# Patient Record
Sex: MALE | Race: WHITE | Employment: FULL TIME | URBAN - METROPOLITAN AREA
[De-identification: names, ages, dates, MRNs, and addresses within clinical notes are randomized per-mention and may not be internally consistent; named-entity substitution may affect disease eponyms.]

---

## 2017-01-27 ENCOUNTER — APPOINTMENT (OUTPATIENT)
Dept: GENERAL RADIOLOGY | Age: 57
End: 2017-01-27
Attending: EMERGENCY MEDICINE
Payer: COMMERCIAL

## 2017-01-27 ENCOUNTER — TELEPHONE (OUTPATIENT)
Dept: FAMILY MEDICINE CLINIC | Facility: CLINIC | Age: 57
End: 2017-01-27

## 2017-01-27 ENCOUNTER — HOSPITAL ENCOUNTER (OUTPATIENT)
Age: 57
Discharge: HOME | End: 2017-01-27
Attending: EMERGENCY MEDICINE
Payer: COMMERCIAL

## 2017-01-27 VITALS
BODY MASS INDEX: 27.2 KG/M2 | WEIGHT: 190 LBS | RESPIRATION RATE: 18 BRPM | DIASTOLIC BLOOD PRESSURE: 78 MMHG | OXYGEN SATURATION: 98 % | HEIGHT: 70 IN | SYSTOLIC BLOOD PRESSURE: 130 MMHG | HEART RATE: 62 BPM | TEMPERATURE: 98 F

## 2017-01-27 DIAGNOSIS — J06.9 UPPER RESPIRATORY TRACT INFECTION, UNSPECIFIED TYPE: Primary | ICD-10-CM

## 2017-01-27 PROCEDURE — 99213 OFFICE O/P EST LOW 20 MIN: CPT

## 2017-01-27 PROCEDURE — 71020 XR CHEST PA + LAT CHEST (CPT=71020): CPT

## 2017-01-27 PROCEDURE — 99204 OFFICE O/P NEW MOD 45 MIN: CPT

## 2017-01-27 RX ORDER — INHALER, ASSIST DEVICES
SPACER (EA) MISCELLANEOUS
Qty: 1 EACH | Refills: 0 | Status: SHIPPED | OUTPATIENT
Start: 2017-01-27

## 2017-01-27 RX ORDER — ALBUTEROL SULFATE 90 UG/1
2 AEROSOL, METERED RESPIRATORY (INHALATION) EVERY 6 HOURS PRN
Qty: 1 INHALER | Refills: 0 | Status: SHIPPED | OUTPATIENT
Start: 2017-01-27 | End: 2017-02-06

## 2017-01-27 NOTE — TELEPHONE ENCOUNTER
Spoke to pt, states that over the weekend he developed a fever with chills/sweating. On Monday, the fever had resolved but he continues to have what he describes as \"gurgling\" in his lungs. Pt able to speak in full sentences, does not sound sob.  Pt has t

## 2017-01-28 NOTE — ED PROVIDER NOTES
Patient Seen in: [facility]    History   Patient presents with:  Cough/URI    Stated Complaint: cough    HPI    29-year-old male patient presents complaining of recovering from flulike illness this past week.   However not Device 01/27/17 6908 None (Room air)       Current:/78 mmHg  Pulse 62  Temp(Src) 97.6 °F (36.4 °C) (Oral)  Resp 18  Ht 177.8 cm (5' 10\")  Wt 86.183 kg  BMI 27.26 kg/m2  SpO2 98%        Physical Exam    Gen: Awake alert in no apparent distress  HEENT

## 2017-01-30 NOTE — TELEPHONE ENCOUNTER
RN F/u call: MELLISSA Pt seen at [facility] as recommended.  Needs f/u appt with JSK for re-eval and to discuss cxr- cardiomegaly (new finding)  JENNA pls book f/u appt thanks

## 2017-01-30 NOTE — TELEPHONE ENCOUNTER
Pt called to schedule follow up. Appointment time and dates available conflict with work schedule. Will contact office later to schedule.

## 2017-01-31 NOTE — TELEPHONE ENCOUNTER
Offered patient SDS slots with Dr Freddy Ervin. Patient declined earlier appointments. Soonest appointment was made for 2/7/17 at 4:40pm with Dr Freddy Ervin at the [facility]. Patient stated that feeling better currently. Not wheezing.

## 2017-02-07 ENCOUNTER — OFFICE VISIT (OUTPATIENT)
Dept: INTERNAL MEDICINE CLINIC | Facility: CLINIC | Age: 57
End: 2017-02-07

## 2017-02-07 VITALS
HEIGHT: 69.5 IN | WEIGHT: 197.19 LBS | BODY MASS INDEX: 28.55 KG/M2 | SYSTOLIC BLOOD PRESSURE: 120 MMHG | TEMPERATURE: 98 F | HEART RATE: 56 BPM | RESPIRATION RATE: 18 BRPM | DIASTOLIC BLOOD PRESSURE: 76 MMHG

## 2017-02-07 DIAGNOSIS — I51.7 CARDIOMEGALY: Primary | ICD-10-CM

## 2017-02-07 PROCEDURE — 99212 OFFICE O/P EST SF 10 MIN: CPT | Performed by: INTERNAL MEDICINE

## 2017-02-07 PROCEDURE — 99213 OFFICE O/P EST LOW 20 MIN: CPT | Performed by: INTERNAL MEDICINE

## 2017-02-07 NOTE — PROGRESS NOTES
HPI:    Patient ID: Shannan Ortiz is a 49year old male. HPI  Patient is here with concerns about x-ray finding. Recently had fevers sweats and then cough and chest problems. Went to urgent care on January 27. Sent home on albuterol.   No antibiotic intact distal pulses. Exam reveals no gallop and no friction rub. No murmur heard. Edema not present. Pulmonary/Chest: Effort normal and breath sounds normal. He has no wheezes. He has no rales. Lymphadenopathy:     He has no cervical adenopathy.

## 2017-02-20 ENCOUNTER — HOSPITAL ENCOUNTER (OUTPATIENT)
Dept: CARDIOLOGY CLINIC | Age: 57
End: 2017-02-20
Attending: INTERNAL MEDICINE
Payer: COMMERCIAL

## 2017-02-20 ENCOUNTER — HOSPITAL ENCOUNTER (OUTPATIENT)
Dept: CV DIAGNOSTICS | Facility: HOSPITAL | Age: 57
Discharge: HOME | End: 2017-02-20
Attending: INTERNAL MEDICINE
Payer: COMMERCIAL

## 2017-02-20 DIAGNOSIS — I51.7 CARDIOMEGALY: ICD-10-CM

## 2017-02-20 PROCEDURE — 93306 TTE W/DOPPLER COMPLETE: CPT

## 2017-02-20 PROCEDURE — 93306 TTE W/DOPPLER COMPLETE: CPT | Performed by: INTERNAL MEDICINE

## 2017-02-21 ENCOUNTER — TELEPHONE (OUTPATIENT)
Dept: INTERNAL MEDICINE CLINIC | Facility: CLINIC | Age: 57
End: 2017-02-21

## 2017-02-22 NOTE — TELEPHONE ENCOUNTER
Spoke with patient over the phone. Echocardiogram showed decreased ejection fraction of 30-35%. No prior history of heart problems. No major symptoms. We will refer to cardiology for further evaluation.

## 2017-03-08 ENCOUNTER — APPOINTMENT (OUTPATIENT)
Dept: LAB | Age: 57
End: 2017-03-08
Attending: INTERNAL MEDICINE
Payer: COMMERCIAL

## 2017-03-08 ENCOUNTER — OFFICE VISIT (OUTPATIENT)
Dept: CARDIOLOGY CLINIC | Facility: CLINIC | Age: 57
End: 2017-03-08

## 2017-03-08 ENCOUNTER — PRIOR ORIGINAL RECORDS (OUTPATIENT)
Dept: OTHER | Age: 57
End: 2017-03-08

## 2017-03-08 VITALS
HEIGHT: 71 IN | BODY MASS INDEX: 27.3 KG/M2 | WEIGHT: 195 LBS | SYSTOLIC BLOOD PRESSURE: 120 MMHG | HEART RATE: 72 BPM | DIASTOLIC BLOOD PRESSURE: 78 MMHG | RESPIRATION RATE: 18 BRPM

## 2017-03-08 DIAGNOSIS — I44.7 LEFT BUNDLE BRANCH BLOCK: Primary | ICD-10-CM

## 2017-03-08 DIAGNOSIS — I42.0 DILATED CARDIOMYOPATHY (HCC): ICD-10-CM

## 2017-03-08 PROBLEM — I42.9 CARDIOMYOPATHY (HCC): Status: ACTIVE | Noted: 2017-03-08

## 2017-03-08 LAB
ANION GAP SERPL CALC-SCNC: 6 MMOL/L (ref 0–18)
BUN SERPL-MCNC: 23 MG/DL (ref 8–20)
BUN/CREAT SERPL: 24.2 (ref 10–20)
CALCIUM SERPL-MCNC: 8.9 MG/DL (ref 8.5–10.5)
CHLORIDE SERPL-SCNC: 106 MMOL/L (ref 95–110)
CO2 SERPL-SCNC: 28 MMOL/L (ref 22–32)
CREAT SERPL-MCNC: 0.95 MG/DL (ref 0.5–1.5)
ERYTHROCYTE [DISTWIDTH] IN BLOOD BY AUTOMATED COUNT: 13.3 % (ref 11–15)
GLUCOSE SERPL-MCNC: 102 MG/DL (ref 70–99)
HCT VFR BLD AUTO: 36.7 % (ref 41–52)
HGB BLD-MCNC: 12.3 G/DL (ref 13.5–17.5)
INR BLD: 1 (ref 0.9–1.2)
MCH RBC QN AUTO: 29.5 PG (ref 27–32)
MCHC RBC AUTO-ENTMCNC: 33.4 G/DL (ref 32–37)
MCV RBC AUTO: 88.3 FL (ref 80–100)
OSMOLALITY UR CALC.SUM OF ELEC: 294 MOSM/KG (ref 275–295)
PLATELET # BLD AUTO: 185 K/UL (ref 140–400)
PMV BLD AUTO: 7.8 FL (ref 7.4–10.3)
POTASSIUM SERPL-SCNC: 4.1 MMOL/L (ref 3.3–5.1)
PROTHROMBIN TIME: 13.1 SECONDS (ref 11.8–14.5)
RBC # BLD AUTO: 4.15 M/UL (ref 4.5–5.9)
SODIUM SERPL-SCNC: 140 MMOL/L (ref 136–144)
WBC # BLD AUTO: 4.6 K/UL (ref 4–11)

## 2017-03-08 PROCEDURE — 36415 COLL VENOUS BLD VENIPUNCTURE: CPT

## 2017-03-08 PROCEDURE — 93010 ELECTROCARDIOGRAM REPORT: CPT | Performed by: INTERNAL MEDICINE

## 2017-03-08 PROCEDURE — 80048 BASIC METABOLIC PNL TOTAL CA: CPT

## 2017-03-08 PROCEDURE — 93005 ELECTROCARDIOGRAM TRACING: CPT | Performed by: INTERNAL MEDICINE

## 2017-03-08 PROCEDURE — 85610 PROTHROMBIN TIME: CPT

## 2017-03-08 PROCEDURE — 99212 OFFICE O/P EST SF 10 MIN: CPT | Performed by: INTERNAL MEDICINE

## 2017-03-08 PROCEDURE — 99245 OFF/OP CONSLTJ NEW/EST HI 55: CPT | Performed by: INTERNAL MEDICINE

## 2017-03-08 PROCEDURE — 85027 COMPLETE CBC AUTOMATED: CPT

## 2017-03-08 NOTE — PROGRESS NOTES
Cardiology Consult Note    3/8/2017    Kwadwo Claudio is a 49year old male. HPI:   5year-old male generally healthy presents for initial evaluation.   Recently was seen with his primary care physician and was having increasing cough with mucus productio rash  HEENT: atraumatic, normocephalic,ears and throat are clear  NECK: supple,no JVD, no bruits  LUNGS: clear to auscultation,no wheeze  CARDIO: RRR without murmur,nl S1,S2,good distal pulse  GI: good BS's,no masses, HSM or tenderness  EXTREMITIES: no cya

## 2017-03-09 ENCOUNTER — TELEPHONE (OUTPATIENT)
Dept: CARDIOLOGY CLINIC | Facility: CLINIC | Age: 57
End: 2017-03-09

## 2017-03-09 NOTE — TELEPHONE ENCOUNTER
Patient scheduled for left heart cath w/ Dr Sheila Diamond at [address] [place] [address] 3/14/17 and requires a proir auth

## 2017-03-13 RX ORDER — SODIUM CHLORIDE 9 MG/ML
INJECTION, SOLUTION INTRAVENOUS ONCE
Status: COMPLETED | OUTPATIENT
Start: 2017-03-14 | End: 2017-03-14

## 2017-03-14 ENCOUNTER — HOSPITAL ENCOUNTER (OUTPATIENT)
Dept: INTERVENTIONAL RADIOLOGY/VASCULAR | Facility: HOSPITAL | Age: 57
Discharge: HOME | End: 2017-03-14
Attending: INTERNAL MEDICINE | Admitting: INTERNAL MEDICINE
Payer: COMMERCIAL

## 2017-03-14 VITALS
HEIGHT: 70 IN | SYSTOLIC BLOOD PRESSURE: 110 MMHG | BODY MASS INDEX: 27.2 KG/M2 | OXYGEN SATURATION: 97 % | HEART RATE: 60 BPM | DIASTOLIC BLOOD PRESSURE: 76 MMHG | RESPIRATION RATE: 18 BRPM | WEIGHT: 190 LBS

## 2017-03-14 DIAGNOSIS — I42.0 DILATED CARDIOMYOPATHY (HCC): ICD-10-CM

## 2017-03-14 PROCEDURE — 4A023N7 MEASUREMENT OF CARDIAC SAMPLING AND PRESSURE, LEFT HEART, PERCUTANEOUS APPROACH: ICD-10-PCS | Performed by: INTERNAL MEDICINE

## 2017-03-14 PROCEDURE — B2111ZZ FLUOROSCOPY OF MULTIPLE CORONARY ARTERIES USING LOW OSMOLAR CONTRAST: ICD-10-PCS | Performed by: INTERNAL MEDICINE

## 2017-03-14 PROCEDURE — B2151ZZ FLUOROSCOPY OF LEFT HEART USING LOW OSMOLAR CONTRAST: ICD-10-PCS | Performed by: INTERNAL MEDICINE

## 2017-03-14 PROCEDURE — 93458 L HRT ARTERY/VENTRICLE ANGIO: CPT

## 2017-03-14 RX ORDER — MIDAZOLAM HYDROCHLORIDE 1 MG/ML
INJECTION INTRAMUSCULAR; INTRAVENOUS
Status: COMPLETED
Start: 2017-03-14 | End: 2017-03-14

## 2017-03-14 RX ORDER — SODIUM CHLORIDE 9 MG/ML
INJECTION, SOLUTION INTRAVENOUS
Status: COMPLETED
Start: 2017-03-14 | End: 2017-03-14

## 2017-03-14 RX ORDER — CARVEDILOL 3.12 MG/1
3.12 TABLET ORAL 2 TIMES DAILY WITH MEALS
Status: DISCONTINUED | OUTPATIENT
Start: 2017-03-15 | End: 2017-03-14

## 2017-03-14 RX ORDER — LIDOCAINE HYDROCHLORIDE 20 MG/ML
INJECTION, SOLUTION EPIDURAL; INFILTRATION; INTRACAUDAL; PERINEURAL
Status: COMPLETED
Start: 2017-03-14 | End: 2017-03-14

## 2017-03-14 RX ORDER — LISINOPRIL 10 MG/1
5 TABLET ORAL DAILY
Status: DISCONTINUED | OUTPATIENT
Start: 2017-03-15 | End: 2017-03-14

## 2017-03-14 RX ORDER — MIDAZOLAM HYDROCHLORIDE 1 MG/ML
2 INJECTION INTRAMUSCULAR; INTRAVENOUS EVERY 5 MIN PRN
Status: DISCONTINUED | OUTPATIENT
Start: 2017-03-14 | End: 2017-03-14

## 2017-03-14 RX ADMIN — SODIUM CHLORIDE: 9 INJECTION, SOLUTION INTRAVENOUS at 08:31:00

## 2017-03-14 RX ADMIN — MIDAZOLAM HYDROCHLORIDE 1 MG: 1 INJECTION INTRAMUSCULAR; INTRAVENOUS at 09:00:00

## 2017-03-14 RX ADMIN — MIDAZOLAM HYDROCHLORIDE 0.5 MG: 1 INJECTION INTRAMUSCULAR; INTRAVENOUS at 09:11:00

## 2017-03-14 RX ADMIN — MIDAZOLAM HYDROCHLORIDE 2 MG: 1 INJECTION INTRAMUSCULAR; INTRAVENOUS at 08:55:00

## 2017-03-14 NOTE — PLAN OF CARE
Pt ambulated in room and to gqscndgw-vchyta-xalkzgjis activity well Site soft dry and intact after ambulation Liquids and food taken post procedure and tolerated well

## 2017-03-14 NOTE — PROCEDURES
LHC:    LM not present separate ostium  LAD 0%, D1 and D2 0%  LCx 0%, OM1 and OM2 0%  RCA small non dominant    LV EF 20%  Angioseal R groin.

## 2017-03-24 ENCOUNTER — PRIOR ORIGINAL RECORDS (OUTPATIENT)
Dept: OTHER | Age: 57
End: 2017-03-24

## 2017-04-07 LAB
BUN: 23 MG/DL
CALCIUM: 8.9 MG/DL
CHLORIDE: 106 MEQ/L
CREATININE, SERUM: 0.95 MG/DL
GLUCOSE: 102 MG/DL
HEMATOCRIT: 36.7 %
HEMOGLOBIN: 12.3 G/DL
MCH: 29.5 PG
MCHC: 33.4 G/DL
MCV: 88.3 FL
PLATELETS: 185 K/UL
POTASSIUM, SERUM: 4.1 MEQ/L
RED BLOOD COUNT: 4.15 X 10-6/U
SODIUM: 140 MEQ/L
WHITE BLOOD COUNT: 4.6 X 10-3/U

## 2017-04-12 ENCOUNTER — PRIOR ORIGINAL RECORDS (OUTPATIENT)
Dept: OTHER | Age: 57
End: 2017-04-12

## 2017-05-17 ENCOUNTER — PRIOR ORIGINAL RECORDS (OUTPATIENT)
Dept: OTHER | Age: 57
End: 2017-05-17

## 2017-06-14 ENCOUNTER — PRIOR ORIGINAL RECORDS (OUTPATIENT)
Dept: OTHER | Age: 57
End: 2017-06-14

## 2017-12-01 ENCOUNTER — MYAURORA ACCOUNT LINK (OUTPATIENT)
Dept: OTHER | Age: 57
End: 2017-12-01

## 2017-12-05 ENCOUNTER — PRIOR ORIGINAL RECORDS (OUTPATIENT)
Dept: OTHER | Age: 57
End: 2017-12-05

## 2017-12-12 ENCOUNTER — OFFICE VISIT (OUTPATIENT)
Dept: CARDIOLOGY CLINIC | Facility: CLINIC | Age: 57
End: 2017-12-12

## 2017-12-12 VITALS
WEIGHT: 180 LBS | SYSTOLIC BLOOD PRESSURE: 110 MMHG | BODY MASS INDEX: 25.77 KG/M2 | HEART RATE: 72 BPM | DIASTOLIC BLOOD PRESSURE: 70 MMHG | HEIGHT: 70 IN | RESPIRATION RATE: 18 BRPM

## 2017-12-12 DIAGNOSIS — I42.9 CARDIOMYOPATHY, UNSPECIFIED TYPE (HCC): Primary | ICD-10-CM

## 2017-12-12 PROCEDURE — 99212 OFFICE O/P EST SF 10 MIN: CPT | Performed by: NURSE PRACTITIONER

## 2017-12-12 PROCEDURE — 99214 OFFICE O/P EST MOD 30 MIN: CPT | Performed by: NURSE PRACTITIONER

## 2017-12-12 RX ORDER — CARVEDILOL 6.25 MG/1
6.25 TABLET ORAL 2 TIMES DAILY WITH MEALS
Qty: 60 TABLET | Refills: 6 | Status: SHIPPED | OUTPATIENT
Start: 2017-12-12 | End: 2018-06-14

## 2017-12-12 RX ORDER — LISINOPRIL 5 MG/1
5 TABLET ORAL DAILY
COMMUNITY
End: 2018-06-14

## 2017-12-12 RX ORDER — CARVEDILOL 3.12 MG/1
3.12 TABLET ORAL 2 TIMES DAILY WITH MEALS
COMMUNITY
End: 2018-06-14

## 2017-12-12 NOTE — PROGRESS NOTES
Georgi Mcmanus is a 56year old male. Patient presents with: Follow - Up: discuss [facility] at [facility]     HPI:   Patient comes in today for a checkup of his cardiomyopathy he sees Dr. Eric Romero at the [address] [place] [address] location as well as at the [facility].   He firs rashes  RESPIRATORY: denies shortness of breath with exertion  CARDIOVASCULAR: no chest pain  GI: denies abdominal pain and denies heartburn  NEURO: denies headaches    EXAM:   /70   Pulse 72   Resp 18   Ht 5' 10\" (1.778 m)   Wt 180 lb (81.6 kg)   B

## 2017-12-12 NOTE — PATIENT INSTRUCTIONS
1. Increase coreg to 6.25 twice a day  2. Check BP at home and call in readings in 2 wks call BP drop below 100  3. Blood test  4.  See Dr. Annmarie Lee in 1-2 months

## 2018-01-10 ENCOUNTER — APPOINTMENT (OUTPATIENT)
Dept: LAB | Age: 58
End: 2018-01-10
Attending: NURSE PRACTITIONER
Payer: COMMERCIAL

## 2018-01-10 DIAGNOSIS — I42.9 CARDIOMYOPATHY, UNSPECIFIED TYPE (HCC): ICD-10-CM

## 2018-01-10 LAB
ANION GAP SERPL CALC-SCNC: 8 MMOL/L (ref 0–18)
BUN SERPL-MCNC: 23 MG/DL (ref 8–20)
BUN/CREAT SERPL: 25.3 (ref 10–20)
CALCIUM SERPL-MCNC: 9.1 MG/DL (ref 8.5–10.5)
CHLORIDE SERPL-SCNC: 103 MMOL/L (ref 95–110)
CO2 SERPL-SCNC: 23 MMOL/L (ref 22–32)
CREAT SERPL-MCNC: 0.91 MG/DL (ref 0.5–1.5)
GLUCOSE SERPL-MCNC: 86 MG/DL (ref 70–99)
OSMOLALITY UR CALC.SUM OF ELEC: 281 MOSM/KG (ref 275–295)
POTASSIUM SERPL-SCNC: 3.9 MMOL/L (ref 3.3–5.1)
SODIUM SERPL-SCNC: 134 MMOL/L (ref 136–144)

## 2018-01-10 PROCEDURE — 80048 BASIC METABOLIC PNL TOTAL CA: CPT

## 2018-01-10 PROCEDURE — 36415 COLL VENOUS BLD VENIPUNCTURE: CPT

## 2018-05-29 ENCOUNTER — TELEPHONE (OUTPATIENT)
Dept: INTERNAL MEDICINE CLINIC | Facility: CLINIC | Age: 58
End: 2018-05-29

## 2018-05-29 DIAGNOSIS — I42.9 CARDIOMYOPATHY, UNSPECIFIED TYPE (HCC): Primary | ICD-10-CM

## 2018-05-30 NOTE — TELEPHONE ENCOUNTER
Patient contacted states that he did not need a referral for Cardiologist, he has PPO insurance. But he also did not want to see Dr Frye Post again.  He states that he rescheduled with another cardiologist but would like to speak to you regarding his conditio

## 2018-06-01 NOTE — TELEPHONE ENCOUNTER
Please clarify. Is the patient okay with waiting to talk to me until June 29 or does he want me to contact him directly now?

## 2018-06-28 PROBLEM — I42.0 DILATED CARDIOMYOPATHY (HCC): Status: ACTIVE | Noted: 2017-03-08

## 2018-06-29 ENCOUNTER — OFFICE VISIT (OUTPATIENT)
Dept: INTERNAL MEDICINE CLINIC | Facility: CLINIC | Age: 58
End: 2018-06-29

## 2018-06-29 VITALS
HEART RATE: 54 BPM | DIASTOLIC BLOOD PRESSURE: 74 MMHG | TEMPERATURE: 98 F | BODY MASS INDEX: 26.92 KG/M2 | HEIGHT: 70 IN | SYSTOLIC BLOOD PRESSURE: 116 MMHG | RESPIRATION RATE: 20 BRPM | WEIGHT: 188 LBS

## 2018-06-29 DIAGNOSIS — I42.9 CARDIOMYOPATHY, UNSPECIFIED TYPE (HCC): ICD-10-CM

## 2018-06-29 DIAGNOSIS — Z12.5 SCREENING FOR PROSTATE CANCER: ICD-10-CM

## 2018-06-29 DIAGNOSIS — Z00.00 ROUTINE PHYSICAL EXAMINATION: Primary | ICD-10-CM

## 2018-06-29 PROCEDURE — 99396 PREV VISIT EST AGE 40-64: CPT | Performed by: INTERNAL MEDICINE

## 2018-06-29 NOTE — PROGRESS NOTES
HPI:    Patient ID: Jesn Moffett is a 66year old male. HPI  Patient is here requesting a general physical exam.  Last seen here in February 2017. At that time had a chest x-ray which showed cardiomegaly. That initiated further testing.   Echocardio abdominal pain, diarrhea and constipation. Genitourinary: Negative for dysuria, hematuria and sexual dysfunction. Musculoskeletal: Negative for joint pain. Skin: Negative for rash. Neurological: Negative for weakness, numbness and headaches.    Hema left inguinal area. Genitourinary: Testes normal and penis normal. Circumcised. Musculoskeletal: He exhibits no tenderness. Lymphadenopathy:     He has no cervical adenopathy. Right: No inguinal adenopathy present.         Left: No inguinal ki

## 2019-03-01 VITALS
WEIGHT: 186 LBS | BODY MASS INDEX: 26.63 KG/M2 | RESPIRATION RATE: 18 BRPM | OXYGEN SATURATION: 98 % | SYSTOLIC BLOOD PRESSURE: 98 MMHG | DIASTOLIC BLOOD PRESSURE: 62 MMHG | HEIGHT: 70 IN | HEART RATE: 74 BPM

## 2019-03-01 VITALS
RESPIRATION RATE: 18 BRPM | HEIGHT: 70 IN | BODY MASS INDEX: 27.2 KG/M2 | OXYGEN SATURATION: 97 % | DIASTOLIC BLOOD PRESSURE: 64 MMHG | WEIGHT: 190 LBS | HEART RATE: 62 BPM | SYSTOLIC BLOOD PRESSURE: 110 MMHG

## 2019-05-10 ENCOUNTER — LAB ENCOUNTER (OUTPATIENT)
Dept: LAB | Age: 59
End: 2019-05-10
Attending: INTERNAL MEDICINE
Payer: COMMERCIAL

## 2019-05-10 DIAGNOSIS — Z12.5 SCREENING FOR PROSTATE CANCER: ICD-10-CM

## 2019-05-10 DIAGNOSIS — I42.9 CARDIOMYOPATHY, UNSPECIFIED TYPE (HCC): ICD-10-CM

## 2019-05-10 DIAGNOSIS — Z00.00 ROUTINE PHYSICAL EXAMINATION: ICD-10-CM

## 2019-05-10 PROCEDURE — 80053 COMPREHEN METABOLIC PANEL: CPT

## 2019-05-10 PROCEDURE — 36415 COLL VENOUS BLD VENIPUNCTURE: CPT

## 2019-05-10 PROCEDURE — 84443 ASSAY THYROID STIM HORMONE: CPT

## 2019-05-10 PROCEDURE — 82607 VITAMIN B-12: CPT

## 2019-05-10 PROCEDURE — 85025 COMPLETE CBC W/AUTO DIFF WBC: CPT

## 2019-05-10 PROCEDURE — 80061 LIPID PANEL: CPT

## 2019-05-13 DIAGNOSIS — R79.89 ABNORMAL TSH: Primary | ICD-10-CM

## 2019-07-17 ENCOUNTER — OFFICE VISIT (OUTPATIENT)
Dept: INTERNAL MEDICINE CLINIC | Facility: CLINIC | Age: 59
End: 2019-07-17
Payer: COMMERCIAL

## 2019-07-17 VITALS
HEIGHT: 70 IN | WEIGHT: 197 LBS | BODY MASS INDEX: 28.2 KG/M2 | DIASTOLIC BLOOD PRESSURE: 78 MMHG | SYSTOLIC BLOOD PRESSURE: 114 MMHG | HEART RATE: 64 BPM

## 2019-07-17 DIAGNOSIS — R21 RASH AND NONSPECIFIC SKIN ERUPTION: ICD-10-CM

## 2019-07-17 DIAGNOSIS — T14.8XXA WOUND INFECTION: Primary | ICD-10-CM

## 2019-07-17 DIAGNOSIS — L08.9 WOUND INFECTION: Primary | ICD-10-CM

## 2019-07-17 DIAGNOSIS — L23.7 POISON IVY DERMATITIS: ICD-10-CM

## 2019-07-17 PROCEDURE — 99212 OFFICE O/P EST SF 10 MIN: CPT | Performed by: NURSE PRACTITIONER

## 2019-07-17 RX ORDER — CEPHALEXIN 500 MG/1
500 CAPSULE ORAL 2 TIMES DAILY
Qty: 20 CAPSULE | Refills: 0 | Status: SHIPPED | OUTPATIENT
Start: 2019-07-17 | End: 2019-07-27

## 2019-07-17 NOTE — ASSESSMENT & PLAN NOTE
Poison IVY Dermatitis with Bulla  1) Soak foot three times per day  2) Cover drainage area with telfa dressing and paper tape. 3) Keflex for infected wounds- Keflex 500mg po Bid for 10 days  4) Hydrocortisone cream to non open areas.   5) Literature printe

## 2019-07-17 NOTE — PROGRESS NOTES
HPI:    Patient ID: Mayra Duran is a 64year old male. HPI   80-year-old white male here for complaints of rash and blisters on his left foot this began on Sunday when he was working in his garden. He complains of pain and itching.   He has fluid-fi carvedilol 25 MG Oral Tab Take 1 tablet (25 mg total) by mouth 2 (two) times daily. Script update to replace RX for 6.25 mg. Disp: 180 tablet Rfl: 3   Losartan Potassium 25 MG Oral Tab Take 1 tablet (25 mg total) by mouth daily.  Disp: 90 tablet Rfl: 3   Sp 1) Soak foot three times per day  2) Cover drainage area with telfa dressing and paper tape. 3) Keflex for infected wounds- Keflex 500mg po Bid for 10 days  4) Hydrocortisone cream to non open areas.   5) Literature printed on poison ivy/ Instructions when

## 2019-07-23 ENCOUNTER — TELEPHONE (OUTPATIENT)
Dept: INTERNAL MEDICINE CLINIC | Facility: CLINIC | Age: 59
End: 2019-07-23

## 2019-09-12 NOTE — PROGRESS NOTES
HPI:    Patient ID: Isac Andrews is a 64year old male. HPI  Patient is here for general physical exam.  I last saw him in June 29 of last year for physical.  No changes made at that time.   He continues to follow-up regularly several times year with Problem Relation Age of Onset   • Obesity Father    • Pulmonary Disease Father         COPD   • Lung Disorder Mother         EMPHYSEMA, CAUSE OF DEATH   • Depression Sister       Social History    Tobacco Use      Smoking status: Never Smoker      [place] PHYSICAL EXAM:   There were no vitals taken for this visit. Physical Exam    Constitutional: He appears well-developed and well-nourished.    HENT:   Right Ear: Tympanic membrane and ear canal normal.   Left Ear: Tympanic membrane and ear canal norm the year reviewed. 2. Dilated cardiomyopathy ([facility] [address]  Continues to be well compensated. Continue losartan and carvedilol. Follow-up with cardiology. No evidence of volume overload or exacerbation at this time.     3. Sleep disturbance  Patient is concer

## 2019-09-13 ENCOUNTER — OFFICE VISIT (OUTPATIENT)
Dept: INTERNAL MEDICINE CLINIC | Facility: CLINIC | Age: 59
End: 2019-09-13
Payer: COMMERCIAL

## 2019-09-13 ENCOUNTER — LAB ENCOUNTER (OUTPATIENT)
Dept: LAB | Facility: HOSPITAL | Age: 59
End: 2019-09-13
Attending: INTERNAL MEDICINE
Payer: COMMERCIAL

## 2019-09-13 VITALS
WEIGHT: 191 LBS | BODY MASS INDEX: 27.35 KG/M2 | HEIGHT: 70 IN | DIASTOLIC BLOOD PRESSURE: 68 MMHG | HEART RATE: 97 BPM | SYSTOLIC BLOOD PRESSURE: 103 MMHG

## 2019-09-13 DIAGNOSIS — I42.0 DILATED CARDIOMYOPATHY (HCC): ICD-10-CM

## 2019-09-13 DIAGNOSIS — Z00.00 ROUTINE PHYSICAL EXAMINATION: Primary | ICD-10-CM

## 2019-09-13 DIAGNOSIS — Z00.00 ROUTINE PHYSICAL EXAMINATION: ICD-10-CM

## 2019-09-13 DIAGNOSIS — G47.9 SLEEP DISTURBANCE: ICD-10-CM

## 2019-09-13 DIAGNOSIS — R79.89 ABNORMAL TSH: ICD-10-CM

## 2019-09-13 DIAGNOSIS — R79.89 ABNORMAL THYROID BLOOD TEST: ICD-10-CM

## 2019-09-13 DIAGNOSIS — R10.9 ABDOMINAL PAIN, UNSPECIFIED ABDOMINAL LOCATION: ICD-10-CM

## 2019-09-13 PROBLEM — L23.7 POISON IVY DERMATITIS: Status: RESOLVED | Noted: 2019-07-17 | Resolved: 2019-09-13

## 2019-09-13 LAB
T3FREE SERPL-MCNC: 2.7 PG/ML (ref 2.4–4.2)
T4 FREE SERPL-MCNC: 1.1 NG/DL (ref 0.8–1.7)
TSI SER-ACNC: 0.28 MIU/ML (ref 0.36–3.74)

## 2019-09-13 PROCEDURE — 36415 COLL VENOUS BLD VENIPUNCTURE: CPT

## 2019-09-13 PROCEDURE — 99396 PREV VISIT EST AGE 40-64: CPT | Performed by: INTERNAL MEDICINE

## 2019-09-13 PROCEDURE — 84439 ASSAY OF FREE THYROXINE: CPT

## 2019-09-13 PROCEDURE — 84481 FREE ASSAY (FT-3): CPT

## 2019-09-13 PROCEDURE — 84443 ASSAY THYROID STIM HORMONE: CPT

## 2019-12-03 ENCOUNTER — OFFICE VISIT (OUTPATIENT)
Dept: SLEEP CENTER | Age: 59
End: 2019-12-03
Attending: INTERNAL MEDICINE
Payer: COMMERCIAL

## 2019-12-03 DIAGNOSIS — Z76.89 SLEEP CONCERN: Primary | ICD-10-CM

## 2019-12-03 PROCEDURE — 95810 POLYSOM 6/> YRS 4/> PARAM: CPT

## 2019-12-05 NOTE — PROCEDURES
[address] [place] [address]  Accredited by the [facility] ([facility])    PATIENT'S NAME: Andre Harrison PHYSICIAN: Lew Donenlly MD   REFERRING PHYSICIAN: Lew Donnelly MD   PATIENT ACCOUNT #: [id number] LOCATION: related to arousal index is 3.8 events per hour and the spontaneous arousal index is 5.5 events per hour, for a combined arousal index of 10.9 events per hour.   There were 160 periodic limb movements for a periodic limb movement index of 23 events per hour

## 2020-10-28 NOTE — TELEPHONE ENCOUNTER
Patient is requesting refill of script Albuterol Sulfate HFA (PROAIR HFA) 108 (90 BASE) MCG/ACT Inhalation Aero Soln. Patient states he is out.

## 2020-10-29 RX ORDER — ALBUTEROL SULFATE 90 UG/1
2 AEROSOL, METERED RESPIRATORY (INHALATION) EVERY 4 HOURS PRN
Qty: 1 INHALER | Refills: 6 | Status: SHIPPED | OUTPATIENT
Start: 2020-10-29

## 2021-02-04 ENCOUNTER — TELEPHONE (OUTPATIENT)
Dept: INTERNAL MEDICINE CLINIC | Facility: CLINIC | Age: 61
End: 2021-02-04

## 2021-02-04 NOTE — TELEPHONE ENCOUNTER
Pt is interested in getting Covid vacine and is a essential worker, a teacher, but he does not want to create a my chart account. Pt. prefers to only get a phone call.

## 2021-05-19 ENCOUNTER — TELEPHONE (OUTPATIENT)
Dept: INTERNAL MEDICINE CLINIC | Facility: CLINIC | Age: 61
End: 2021-05-19

## 2021-05-19 ENCOUNTER — OFFICE VISIT (OUTPATIENT)
Dept: INTERNAL MEDICINE CLINIC | Facility: CLINIC | Age: 61
End: 2021-05-19
Payer: COMMERCIAL

## 2021-05-19 VITALS
DIASTOLIC BLOOD PRESSURE: 72 MMHG | SYSTOLIC BLOOD PRESSURE: 110 MMHG | WEIGHT: 200 LBS | HEIGHT: 70 IN | RESPIRATION RATE: 18 BRPM | HEART RATE: 58 BPM | BODY MASS INDEX: 28.63 KG/M2

## 2021-05-19 DIAGNOSIS — D23.5 BENIGN NEOPLASM OF SKIN OF TRUNK: Primary | ICD-10-CM

## 2021-05-19 PROCEDURE — 3074F SYST BP LT 130 MM HG: CPT | Performed by: INTERNAL MEDICINE

## 2021-05-19 PROCEDURE — 99213 OFFICE O/P EST LOW 20 MIN: CPT | Performed by: INTERNAL MEDICINE

## 2021-05-19 PROCEDURE — 3008F BODY MASS INDEX DOCD: CPT | Performed by: INTERNAL MEDICINE

## 2021-05-19 PROCEDURE — 3078F DIAST BP <80 MM HG: CPT | Performed by: INTERNAL MEDICINE

## 2021-05-19 NOTE — PROGRESS NOTES
HPI:    Patient ID: Xochilt Tsai is a 63year old male. HPI  Patient is here complaining of dry spots on his skin. We last saw him in September 2019 for physical exam.  Questionable sleep apnea by symptoms.   Sleep study at that time showed very mild times daily. 180 tablet 3   • Losartan Potassium 25 MG Oral Tab Take 1 tablet (25 mg total) by mouth daily. 90 tablet 3   • hydrocortisone 2.5 % External Cream Apply 1 Application topically 2 (two) times daily.  20 g 0   • Spacer/Aero-Holding Chambers (AERO Visit:  Requested Prescriptions      No prescriptions requested or ordered in this encounter       Imaging & Referrals:  None         Paul Sewell MD

## 2021-05-19 NOTE — TELEPHONE ENCOUNTER
Patient scheduled physical for July, patient would like bloodwork ordered before appt. Please advise.

## 2021-05-19 NOTE — TELEPHONE ENCOUNTER
Patient calling and have a referral for Dr. Velasquez Arrraphael dermatology she have nothing available until August 31 he is asking can you recommend someone else beside her.       Please advise

## 2021-05-20 DIAGNOSIS — Z00.00 ROUTINE CHECK-UP: Primary | ICD-10-CM

## 2021-05-20 DIAGNOSIS — I42.0 DILATED CARDIOMYOPATHY (HCC): ICD-10-CM

## 2021-07-14 ENCOUNTER — LAB ENCOUNTER (OUTPATIENT)
Dept: LAB | Age: 61
End: 2021-07-14
Attending: NURSE PRACTITIONER
Payer: COMMERCIAL

## 2021-07-14 DIAGNOSIS — Z00.00 ROUTINE CHECK-UP: ICD-10-CM

## 2021-07-14 PROBLEM — E78.00 HYPERCHOLESTEROLEMIA: Status: ACTIVE | Noted: 2021-07-14

## 2021-07-14 LAB
ALBUMIN SERPL-MCNC: 3.6 G/DL (ref 3.4–5)
ALBUMIN/GLOB SERPL: 1.1 {RATIO} (ref 1–2)
ALP LIVER SERPL-CCNC: 64 U/L
ALT SERPL-CCNC: 36 U/L
ANION GAP SERPL CALC-SCNC: 2 MMOL/L (ref 0–18)
AST SERPL-CCNC: 22 U/L (ref 15–37)
BASOPHILS # BLD AUTO: 0.04 X10(3) UL (ref 0–0.2)
BASOPHILS NFR BLD AUTO: 0.7 %
BILIRUB SERPL-MCNC: 1 MG/DL (ref 0.1–2)
BUN BLD-MCNC: 10 MG/DL (ref 7–18)
BUN/CREAT SERPL: 11.2 (ref 10–20)
CALCIUM BLD-MCNC: 9 MG/DL (ref 8.5–10.1)
CHLORIDE SERPL-SCNC: 110 MMOL/L (ref 98–112)
CHOLEST SMN-MCNC: 212 MG/DL (ref ?–200)
CO2 SERPL-SCNC: 29 MMOL/L (ref 21–32)
COMPLEXED PSA SERPL-MCNC: 1.45 NG/ML (ref ?–4)
CREAT BLD-MCNC: 0.89 MG/DL
DEPRECATED RDW RBC AUTO: 41 FL (ref 35.1–46.3)
EOSINOPHIL # BLD AUTO: 0.3 X10(3) UL (ref 0–0.7)
EOSINOPHIL NFR BLD AUTO: 5.4 %
ERYTHROCYTE [DISTWIDTH] IN BLOOD BY AUTOMATED COUNT: 12.3 % (ref 11–15)
GLOBULIN PLAS-MCNC: 3.4 G/DL (ref 2.8–4.4)
GLUCOSE BLD-MCNC: 94 MG/DL (ref 70–99)
HCT VFR BLD AUTO: 40.8 %
HDLC SERPL-MCNC: 38 MG/DL (ref 40–59)
HGB BLD-MCNC: 13.3 G/DL
IMM GRANULOCYTES # BLD AUTO: 0.01 X10(3) UL (ref 0–1)
IMM GRANULOCYTES NFR BLD: 0.2 %
LDLC SERPL CALC-MCNC: 150 MG/DL (ref ?–100)
LYMPHOCYTES # BLD AUTO: 2.57 X10(3) UL (ref 1–4)
LYMPHOCYTES NFR BLD AUTO: 46.4 %
M PROTEIN MFR SERPL ELPH: 7 G/DL (ref 6.4–8.2)
MCH RBC QN AUTO: 29.8 PG (ref 26–34)
MCHC RBC AUTO-ENTMCNC: 32.6 G/DL (ref 31–37)
MCV RBC AUTO: 91.3 FL
MONOCYTES # BLD AUTO: 0.58 X10(3) UL (ref 0.1–1)
MONOCYTES NFR BLD AUTO: 10.5 %
NEUTROPHILS # BLD AUTO: 2.04 X10 (3) UL (ref 1.5–7.7)
NEUTROPHILS # BLD AUTO: 2.04 X10(3) UL (ref 1.5–7.7)
NEUTROPHILS NFR BLD AUTO: 36.8 %
NONHDLC SERPL-MCNC: 174 MG/DL (ref ?–130)
OSMOLALITY SERPL CALC.SUM OF ELEC: 291 MOSM/KG (ref 275–295)
PATIENT FASTING Y/N/NP: YES
PATIENT FASTING Y/N/NP: YES
PLATELET # BLD AUTO: 175 10(3)UL (ref 150–450)
POTASSIUM SERPL-SCNC: 4.8 MMOL/L (ref 3.5–5.1)
RBC # BLD AUTO: 4.47 X10(6)UL
SODIUM SERPL-SCNC: 141 MMOL/L (ref 136–145)
TRIGL SERPL-MCNC: 131 MG/DL (ref 30–149)
TSI SER-ACNC: 0.48 MIU/ML (ref 0.36–3.74)
VIT B12 SERPL-MCNC: 467 PG/ML (ref 193–986)
VLDLC SERPL CALC-MCNC: 25 MG/DL (ref 0–30)
WBC # BLD AUTO: 5.5 X10(3) UL (ref 4–11)

## 2021-07-14 PROCEDURE — 82306 VITAMIN D 25 HYDROXY: CPT

## 2021-07-14 PROCEDURE — 84443 ASSAY THYROID STIM HORMONE: CPT

## 2021-07-14 PROCEDURE — 82607 VITAMIN B-12: CPT

## 2021-07-14 PROCEDURE — 85025 COMPLETE CBC W/AUTO DIFF WBC: CPT

## 2021-07-14 PROCEDURE — 36415 COLL VENOUS BLD VENIPUNCTURE: CPT

## 2021-07-14 PROCEDURE — 80061 LIPID PANEL: CPT

## 2021-07-14 PROCEDURE — 80053 COMPREHEN METABOLIC PANEL: CPT

## 2021-07-14 PROCEDURE — 81015 MICROSCOPIC EXAM OF URINE: CPT

## 2021-07-16 ENCOUNTER — OFFICE VISIT (OUTPATIENT)
Dept: INTERNAL MEDICINE CLINIC | Facility: CLINIC | Age: 61
End: 2021-07-16
Payer: COMMERCIAL

## 2021-07-16 VITALS
HEIGHT: 70 IN | BODY MASS INDEX: 28.72 KG/M2 | HEART RATE: 58 BPM | SYSTOLIC BLOOD PRESSURE: 124 MMHG | DIASTOLIC BLOOD PRESSURE: 83 MMHG | WEIGHT: 200.63 LBS

## 2021-07-16 DIAGNOSIS — Z00.00 ROUTINE PHYSICAL EXAMINATION: Primary | ICD-10-CM

## 2021-07-16 DIAGNOSIS — G47.33 OSA (OBSTRUCTIVE SLEEP APNEA): ICD-10-CM

## 2021-07-16 DIAGNOSIS — I42.0 DILATED CARDIOMYOPATHY (HCC): ICD-10-CM

## 2021-07-16 DIAGNOSIS — E78.5 HYPERLIPIDEMIA, UNSPECIFIED HYPERLIPIDEMIA TYPE: ICD-10-CM

## 2021-07-16 LAB — 25(OH)D3 SERPL-MCNC: 15.5 NG/ML (ref 30–100)

## 2021-07-16 PROCEDURE — 99396 PREV VISIT EST AGE 40-64: CPT | Performed by: INTERNAL MEDICINE

## 2021-07-16 PROCEDURE — 3074F SYST BP LT 130 MM HG: CPT | Performed by: INTERNAL MEDICINE

## 2021-07-16 PROCEDURE — 3079F DIAST BP 80-89 MM HG: CPT | Performed by: INTERNAL MEDICINE

## 2021-07-16 PROCEDURE — 3008F BODY MASS INDEX DOCD: CPT | Performed by: INTERNAL MEDICINE

## 2021-07-16 NOTE — PROGRESS NOTES
HPI:    Patient ID: Alise Deleon is a 63year old male. HPI  Patient is here requesting a physical exam as well as follow-up on chronic medical issues as listed below. Last seen in the office on May 19.   That was for multiple benign-appearing skin l Alcohol use: Yes      Comment: BEER&WINE 2 DRINKS DAILY    Drug use: No         Review of Systems          Current Outpatient Medications   Medication Sig Dispense Refill   • Albuterol Sulfate HFA (PROAIR HFA) 108 (90 Base) MCG/ACT Inhalation Aero Soln Inh Routine physical examination  Physical exam is unremarkable. Recent blood test reviewed. Active issues as below. Health maintenance issues reviewed. Overall the patient is doing well. 2. Dilated cardiomyopathy ([address]  Well compensated.   No evidence o

## 2021-07-29 ENCOUNTER — TELEPHONE (OUTPATIENT)
Dept: INTERNAL MEDICINE CLINIC | Facility: CLINIC | Age: 61
End: 2021-07-29

## 2021-07-29 NOTE — TELEPHONE ENCOUNTER
Pt recived a call from his pharmacy for a prescription but during his last visit with  they did not discuss any new medication. On chart it looks like it was sent by Charlene Benítez but pt states he has never seen her.    Pt requesting call back just luis

## 2021-07-30 NOTE — TELEPHONE ENCOUNTER
Spoke with patient and explained to him that Tania Whitehead had prescribed the Vitamin D medication because his levels were low. Patient states that this was never mentioned to him at his appointment with Ngozi Lama.  Patient wants to make sure that Ngozi Lama

## 2021-08-02 NOTE — TELEPHONE ENCOUNTER
Please call the patient. The vitamin D level was quite low at 15.5. It may not have been back by the time we had the visit. Sometimes there is a delay in getting the vitamin D results.   I am okay with him starting the prescription vitamin D as prescribe

## 2021-09-17 NOTE — ED INITIAL ASSESSMENT (HPI)
Patient Name:  Erlin Blanco  YOB: 1945  MRN:  [id number]  Admit Date:  9/16/2021  Patient Care Team:  Zamzam John APRN as PCP - General (Family Medicine)  Akhil Rossi MD as Consulting Physician (Pulmonary Disease)  Bertin Perrin MD as Consulting Physician (Orthopedic Surgery)  Marika Arias MD as Consulting Physician (Cardiology)  Bakari Chapman Jr., MD as Consulting Physician (Urology)  Remy Thomas MD as Consulting Physician (Hematology and Oncology)      Subjective   History Present Illness     Chief Complaint   Patient presents with   • Fever   • Fatigue       Mr. Blanco is a 76 y.o. former smoker with a history of polycythemia vera, hypertension, chronic diastolic congestive heart failure, hyperlipidemia, history of CVA, type 2 diabetes mellitus, atrial flutter, chronic anticoagulation (Eliquis) who presents to [facility] chief complaint of fatigue, fever and admitted for suspected pneumonia of left lung due to infectious organism.    Patient answering orientation questions appropriate appears to be reliable historian.  Wife at bedside serves as primary historian noted patient doing well until yesterday when he developed progressive generalized weakness with associated T-max 103 & family concerns for confusion; therefore, proceed to [facility] for further evaluation    Reported recent exposure to illness with light testing positive for Covid-19 approximately 1 month ago.    AVSS on room air and now T-max 101.3, serum troponin 0.01 increased to 0.02; however, denies history of CAD and currently denies chest pain, elevated CRP 7.0 appears chronic with review of trend, unremarkable serum lactate 0.8 and procalcitonin 0.09, minimal leukocytosis WBC count 11,000 and improving to 10,000 following empiric antibiotic therapy IV azithromycin, IV ceftriaxone, and 1 L LR IV fluid bolus.  Chest x-ray concerning for left lower lobe infiltrate.  COVID-19 not  Reports feeling \"sweaty\" chilled reports t max 101 on Saturday, states temperature has since resolved. + dry cough. Taking inhaler at home and mucinex without significant relief in symptoms. detected.    Further recommendations per hospital course.  See additional details below in assessment/plan.     History of Present Illness  Review of Systems   Constitutional: Positive for diaphoresis and fever. Negative for chills.   HENT: Negative for congestion and rhinorrhea.    Respiratory: Positive for cough. Negative for shortness of breath.    Cardiovascular: Positive for leg swelling (Chronic LLE). Negative for chest pain.   Gastrointestinal: Negative for abdominal pain, constipation, diarrhea, nausea and vomiting.   Endocrine: Negative for polydipsia, polyphagia and polyuria.   Genitourinary: Positive for difficulty urinating. Negative for dysuria.   Musculoskeletal: Positive for gait problem (Secondary generalized weakness). Negative for myalgias.   Skin: Negative for rash and wound.   Neurological: Positive for weakness (Generalized weakness). Negative for dizziness, tremors, seizures, syncope, facial asymmetry, speech difficulty, light-headedness, numbness and headaches.   Psychiatric/Behavioral: Positive for confusion (per family). Negative for hallucinations.        Personal History     Past Medical History:   Diagnosis Date   • Abnormal ECG    • Abnormal stress test    • Abrasion of face 1/29/2020   • Acute bronchitis    • Acute hypokalemia 9/16/2020   • Acute non-recurrent sinusitis 1/13/2020   • Acute pyelonephritis 10/9/2020   • Acute sinusitis    • DORI (acute kidney injury) (CMS/[facility]) 4/28/2017   • Allergic rhinitis    • Aortic root dilation (CMS/[facility])    • Arthritis    • Bacteremia due to Escherichia coli 8/17/2020   • Balanitis 5/16/2021   • Benign enlargement of prostate    • C. difficile colitis 9/16/2020   • Cellulitis of knee, left 5/4/2017   • CHF (congestive heart failure) (CMS/[facility])    • Chronic diastolic congestive heart failure (CMS/[facility])    • Constipation 11/14/2020   • Contusion of face 1/29/2020   • Coronary artery disease    • CVA (cerebral vascular accident) (CMS/[facility]) 2020   •  Diminished pulse     in lower extremities   • Edema    • Elevated hematocrit    • Elevated hemoglobin (CMS/[facility])    • Essential hypertension    • Hearing loss     mala hearing aids   • Heart valve disease    • High risk medication use    • History of back pain    • History of dysuria    • History of echocardiogram    • History of intracranial hemorrhage    • History of scoliosis    • History of stroke    • Hyperlipidemia    • Hypokalemia 1/7/2021   • Injury of toe, left, initial encounter    • Irregular heart rate    • Knee pain, left    • Left bundle branch block    • Leg wound, left    • Leukocytosis 9/16/2020   • Low back pain    • Medicare annual wellness visit, subsequent    • Minor head injury without loss of consciousness 1/29/2020   • Murmur    • Muscle cramps    • Need for hepatitis C screening test    • Paraphimosis 5/16/2021   • Polycythemia    • Polycythemia vera (CMS/[facility])    • Pressure injury of buttock, stage 2 (CMS/[facility]) 9/18/2020   • Prostate hyperplasia without urinary obstruction    • Prostatitis    • Proteinuria    • Pulmonary hypertension (CMS/[facility])    • Sacroiliitis (CMS/[facility]) 10/9/2020   • Scoliosis    • Sepsis due to Escherichia coli (CMS/[facility]) 8/17/2020   • Sepsis with metabolic encephalopathy (CMS/[facility]) 8/17/2020   • Stroke (CMS/[facility])    • SVT (supraventricular tachycardia) (CMS/[facility])    • Tachycardia    • Thrombocytosis (CMS/[facility])    • TIA (transient ischemic attack)     2006   • Type 2 diabetes mellitus (CMS/[facility])    • Type 2 diabetes mellitus with hyperglycemia (CMS/[facility]) 5/16/2021   • Urinary tract infection due to extended-spectrum beta lactamase (ESBL) producing Escherichia coli 8/18/2020   • Valvular heart disease      Past Surgical History:   Procedure Laterality Date   • CARDIAC CATHETERIZATION     • COLONOSCOPY      did Cologuard approx 6/2019 and negative   • HAND SURGERY     • JOINT REPLACEMENT  2014    Rig Knee   • MA TOTAL KNEE ARTHROPLASTY Left 4/27/2017    Procedure: LT TOTAL KNEE  ARTHROPLASTY;  Surgeon: Bertin Perrin MD;  Location: [facility];  Service: Orthopedics   • PROSTATE SURGERY      Rezum steam treatment to shrink prostate by dr. guerrero     Family History   Problem Relation Age of Onset   • Hypertension Mother    • Other Father         ruptured appendix,  when pt. was 14 years old.   • Diabetes Paternal Aunt    • Diabetes Paternal Uncle    • No Known Problems Other    • Migraines Sister    • Stroke Sister    • Dementia Brother      Social History     Tobacco Use   • Smoking status: Former Smoker     Types: Cigarettes     Quit date:      Years since quittin.7   • Smokeless tobacco: Former User   • Tobacco comment: Caffeine daily   Substance Use Topics   • Alcohol use: No   • Drug use: No     No current facility-administered medications on file prior to encounter.     Current Outpatient Medications on File Prior to Encounter   Medication Sig Dispense Refill   • Accu-Chek FastClix Lancets misc TEST BLOOD SUGAR 1-2 TIMES DAILY AND AS NEEDED     • acetaminophen (TYLENOL) 500 MG tablet Take 1,000 mg by mouth 2 (Two) Times a Day As Needed for Mild Pain  or Moderate Pain .     • aspirin (ASPIRIN LOW DOSE) 81 MG EC tablet Take 1 tablet by mouth Daily. 90 tablet 2   • Blood Glucose Monitoring Suppl (Accu-Chek Guide Me) w/Device kit TEST BLOOD SUGAR 1-2 TIMES DAILY AND AS NEEDED     • carvedilol (COREG) 12.5 MG tablet Take 3 tablets by mouth 2 (Two) Times a Day With Meals. 30 tablet 0   • dilTIAZem CD (CARDIZEM CD) 360 MG 24 hr capsule Take 1 capsule by mouth Daily. 30 capsule 0   • Eliquis 5 MG tablet tablet TAKE 1 TABLET EVERY 12 HOURS FOR ATRIAL FIBRILLATION 180 tablet 1   • finasteride (PROSCAR) 5 MG tablet Take 5 mg by mouth Every Night.     • glimepiride (Amaryl) 2 MG tablet Take 1 tablet by mouth 2 (Two) Times a Day Before Meals. (Patient taking differently: Take 2 mg by mouth 2 (Two) Times a Day Before Meals. 1 tab in am and 1/2 tab in pm.) 180 tablet 1   •  glucose blood (OneTouch Ultra) test strip Test blood sugar 1-2 times daily and as needed. 200 each 3   • hydrALAZINE (APRESOLINE) 100 MG tablet Take 1 tablet by mouth 3 (Three) Times a Day. 30 tablet 0   • hydroxyurea (HYDREA) 500 MG capsule Take 500 mg by mouth Daily.     • losartan (COZAAR) 100 MG tablet TAKE 1 TABLET DAILY 90 tablet 1   • memantine (NAMENDA) 5 MG tablet Take 1 tablet by mouth Daily. 30 tablet 0   • montelukast (SINGULAIR) 10 MG tablet TAKE 1 TABLET DAILY 90 tablet 1   • potassium chloride (KLOR-CON) 20 MEQ CR tablet Take 1 tablet by mouth 3 (Three) Times a Day. 30 tablet 0   • pravastatin (PRAVACHOL) 40 MG tablet TAKE 1 TABLET DAILY 90 tablet 1   • Probiotic Product (Probiotic Daily) capsule Take 1 capsule by mouth Daily. 90 capsule 1   • terazosin (HYTRIN) 2 MG capsule TAKE 1 CAPSULE EVERY NIGHT 90 capsule 1   • torsemide (DEMADEX) 20 MG tablet Take 1 tablet by mouth 2 (Two) Times a Day. 60 tablet 5   • cetirizine (zyrTEC) 10 MG tablet Take 10 mg by mouth Daily.     • fluticasone (Flonase) 50 MCG/ACT nasal spray 2 sprays into the nostril(s) as directed by provider Daily. 3 bottle 1   • Insulin Pen Needle (B-D UF III MINI PEN NEEDLES) 31G X 5 MM misc Inject 1 each under the skin into the appropriate area as directed Daily. 100 each 2   • Multiple Vitamins-Minerals (MULTIVITAMIN ADULT PO) Take 1 tablet by mouth Daily.       Allergies   Allergen Reactions   • Donepezil Hallucinations   • Steglatro [Ertugliflozin] Other (See Comments)     balanitis       Objective    Objective     Vital Signs  Temp:  [97.4 °F (36.3 °C)-101.3 °F (38.5 °C)] 101.3 °F (38.5 °C)  Heart Rate:  [] 67  Resp:  [18-20] 18  BP: (128-164)/(65-96) 155/74  SpO2:  [90 %-98 %] 93 %  on   ;   Device (Oxygen Therapy): room air  Body mass index is 35.29 kg/m².    Physical Exam  Constitutional:       General: He is not in acute distress.     Appearance: He is obese. He is ill-appearing and diaphoretic. He is not toxic-appearing.       Comments: Generally weak   HENT:      Head: Normocephalic and atraumatic.   Eyes:      Extraocular Movements: Extraocular movements intact.      Conjunctiva/sclera: Conjunctivae normal.   Cardiovascular:      Rate and Rhythm: Bradycardia present.      Heart sounds: Murmur heard.     Pulmonary:      Effort: Pulmonary effort is normal.      Comments: Diminished on expiration throughout all lung fields  Abdominal:      Palpations: Abdomen is soft.      Tenderness: There is no abdominal tenderness. There is no guarding.      Comments: Hypoactive   Musculoskeletal:         General: No tenderness.      Cervical back: Normal range of motion and neck supple.      Right lower leg: No edema.      Left lower leg: Edema present.   Skin:     General: Skin is warm.   Neurological:      Mental Status: He is oriented to person, place, and time.      Cranial Nerves: No cranial nerve deficit.      Motor: Weakness (Generalized ) present.   Psychiatric:         Behavior: Behavior normal.         Thought Content: Thought content normal.      Comments: Flat affect         Results Review:  I reviewed the patient's new clinical results.  I reviewed the patient's new imaging results and agree with the interpretation.  I reviewed the patient's other test results and agree with the interpretation  I personally viewed and interpreted the patient's EKG/Telemetry data  Discussed with ED provider.    Lab Results (last 24 hours)     Procedure Component Value Units Date/Time    COVID-19,[facility] IN-HOUSE CEPHEID/LINDA NP SWAB IN TRANSPORT MEDIA 8-12 HR TAT - Swab, Nasopharynx [[id number]]  (Normal) Collected: 09/16/21 2352    Specimen: Swab from Nasopharynx Updated: 09/17/21 0101     COVID19 Not Detected    Narrative:      Fact sheet for providers: https://www.fda.gov/media/241221/download     Fact sheet for patients: https://www.fda.gov/media/540448/download    Comprehensive Metabolic Panel [[id number]]  (Abnormal) Collected: 09/16/21 6903     "Specimen: Blood Updated: 09/17/21 0100     Glucose 166 mg/dL      BUN 17 mg/dL      Creatinine 1.18 mg/dL      Sodium 138 mmol/L      Potassium 3.6 mmol/L      Chloride 101 mmol/L      CO2 25.8 mmol/L      Calcium 9.4 mg/dL      Total Protein 7.2 g/dL      Albumin 3.90 g/dL      ALT (SGPT) 17 U/L      AST (SGOT) 19 U/L      Alkaline Phosphatase 87 U/L      Total Bilirubin 0.5 mg/dL      eGFR Non African Amer 60 mL/min/1.73      Globulin 3.3 gm/dL      A/G Ratio 1.2 g/dL      BUN/Creatinine Ratio 14.4     Anion Gap 11.2 mmol/L     Narrative:      GFR Normal >60  Chronic Kidney Disease <60  Kidney Failure <15      Procalcitonin [[id number]]  (Normal) Collected: 09/16/21 2353    Specimen: Blood Updated: 09/17/21 0046     Procalcitonin 0.09 ng/mL     Narrative:      As a Marker for Sepsis (Non-Neonates):     1. <0.5 ng/mL represents a low risk of severe sepsis and/or septic shock.  2. >2 ng/mL represents a high risk of severe sepsis and/or septic shock.    As a Marker for Lower Respiratory Tract Infections that require antibiotic therapy:  PCT on Admission     Antibiotic Therapy             6-12 Hrs later  >0.5                          Strongly Recommended            >0.25 - <0.5             Recommended  0.1 - 0.25                  Discouraged                       Remeasure/reassess PCT  <0.1                         Strongly Discouraged         Remeasure/reassess PCT      As 28 day mortality risk marker: \"Change in Procalcitonin Result\" (>80% or <=80%) if Day 0 (or Day 1) and Day 4 values are available. Refer to http://www.[employer]s-pct-calculator.com/    Change in PCT <=80 %   A decrease of PCT levels below or equal to 80% defines a positive change in PCT test result representing a higher risk for 28-day all-cause mortality of patients diagnosed with severe sepsis or septic shock.    Change in PCT >80 %   A decrease of PCT levels of more than 80% defines a negative change in PCT result representing a lower risk for 28-day " all-cause mortality of patients diagnosed with severe sepsis or septic shock.              Results may be falsely decreased if patient taking Biotin.     Blood Culture - Blood, Arm, Left [[id number]] Collected: 09/16/21 2353    Specimen: Blood from Arm, Left Updated: 09/17/21 0008    Blood Culture - Blood, Arm, Left [[id number]] Collected: 09/16/21 2353    Specimen: Blood from Arm, Left Updated: 09/17/21 0009    C-reactive Protein [[id number]]  (Abnormal) Collected: 09/16/21 2353    Specimen: Blood Updated: 09/17/21 0100     C-Reactive Protein 7.00 mg/dL     Ferritin [[id number]]  (Normal) Collected: 09/16/21 2353    Specimen: Blood Updated: 09/17/21 0046     Ferritin 245.00 ng/mL     Narrative:      Results may be falsely decreased if patient taking Biotin.      Troponin [[id number]]  (Normal) Collected: 09/16/21 2353    Specimen: Blood Updated: 09/17/21 0105     Troponin T 0.017 ng/mL     Narrative:      Troponin T Reference Range:  <= 0.03 ng/mL-   Negative for AMI  >0.03 ng/mL-     Abnormal for myocardial necrosis.  Clinicians would have to utilize clinical acumen, EKG, Troponin and serial changes to determine if it is an Acute Myocardial Infarction or myocardial injury due to an underlying chronic condition.       Results may be falsely decreased if patient taking Biotin.      CBC & Differential [[id number]]  (Abnormal) Collected: 09/16/21 2354    Specimen: Blood Updated: 09/17/21 0013    Narrative:      The following orders were created for panel order CBC & Differential.  Procedure                               Abnormality         Status                     ---------                               -----------         ------                     CBC Auto Differential[[id number]]        Abnormal            Final result                 Please view results for these tests on the individual orders.    Lactic Acid, Plasma [[id number]]  (Normal) Collected: 09/16/21 2354    Specimen: Blood Updated: 09/17/21 0025     Lactate  0.8 mmol/L     CBC Auto Differential [[id number]]  (Abnormal) Collected: 09/16/21 2354    Specimen: Blood Updated: 09/17/21 0013     WBC 11.08 10*3/mm3      RBC 4.27 10*6/mm3      Hemoglobin 13.6 g/dL      Hematocrit 39.8 %      MCV 93.2 fL      MCH 31.9 pg      MCHC 34.2 g/dL      RDW 15.7 %      RDW-SD 51.7 fl      MPV 9.9 fL      Platelets 316 10*3/mm3      Neutrophil % 72.5 %      Lymphocyte % 12.1 %      Monocyte % 12.0 %      Eosinophil % 2.0 %      Basophil % 0.9 %      Immature Grans % 0.5 %      Neutrophils, Absolute 8.03 10*3/mm3      Lymphocytes, Absolute 1.34 10*3/mm3      Monocytes, Absolute 1.33 10*3/mm3      Eosinophils, Absolute 0.22 10*3/mm3      Basophils, Absolute 0.10 10*3/mm3      Immature Grans, Absolute 0.06 10*3/mm3      nRBC 0.0 /100 WBC     Urinalysis With Microscopic If Indicated (No Culture) - Urine, Clean Catch [[id number]]  (Abnormal) Collected: 09/16/21 2358    Specimen: Urine, Clean Catch Updated: 09/17/21 0025     Color, UA Yellow     Appearance, UA Clear     pH, UA 5.5     Specific Gravity, UA 1.007     Glucose, UA Negative     Ketones, UA Negative     Bilirubin, UA Negative     Blood, UA Negative     Protein, UA Trace     Leuk Esterase, UA Small (1+)     Nitrite, UA Negative     Urobilinogen, UA 0.2 E.U./dL    Urinalysis, Microscopic Only - Urine, Clean Catch [[id number]]  (Abnormal) Collected: 09/16/21 2358    Specimen: Urine, Clean Catch Updated: 09/17/21 0026     RBC, UA 0-2 /HPF      WBC, UA 6-12 /HPF      Bacteria, UA None Seen /HPF      Squamous Epithelial Cells, UA 0-2 /HPF      Hyaline Casts, UA 0-2 /LPF      Methodology Automated Microscopy    Legionella Antigen, Urine - Urine, Urine, Clean Catch [[id number]]  (Normal) Collected: 09/16/21 2358    Specimen: Urine, Clean Catch Updated: 09/17/21 1129     LEGIONELLA ANTIGEN, URINE Negative    MRSA Screen, PCR (Inpatient) - Swab, Nares [[id number]]  (Normal) Collected: 09/17/21 0356    Specimen: Swab from Nares Updated: 09/17/21  0924     MRSA PCR No MRSA Detected    POC Glucose Once [[id number]]  (Normal) Collected: 09/17/21 0604    Specimen: Blood Updated: 09/17/21 0606     Glucose 121 mg/dL      Comment: Meter: [id number] : [id number] Fred ESTRELLA       Basic Metabolic Panel [[id number]]  (Abnormal) Collected: 09/17/21 0714    Specimen: Blood Updated: 09/17/21 0832     Glucose 114 mg/dL      BUN 15 mg/dL      Creatinine 1.06 mg/dL      Sodium 139 mmol/L      Potassium 3.7 mmol/L      Chloride 103 mmol/L      CO2 27.1 mmol/L      Calcium 8.9 mg/dL      eGFR Non African Amer 68 mL/min/1.73      BUN/Creatinine Ratio 14.2     Anion Gap 8.9 mmol/L     Narrative:      GFR Normal >60  Chronic Kidney Disease <60  Kidney Failure <15      CBC Auto Differential [[id number]]  (Abnormal) Collected: 09/17/21 0714    Specimen: Blood Updated: 09/17/21 0806     WBC 10.00 10*3/mm3      RBC 4.14 10*6/mm3      Hemoglobin 12.8 g/dL      Hematocrit 39.5 %      MCV 95.4 fL      MCH 30.9 pg      MCHC 32.4 g/dL      RDW 15.0 %      RDW-SD 51.7 fl      MPV 9.8 fL      Platelets 308 10*3/mm3      Neutrophil % 72.4 %      Lymphocyte % 11.2 %      Monocyte % 13.1 %      Eosinophil % 2.0 %      Basophil % 0.8 %      Immature Grans % 0.5 %      Neutrophils, Absolute 7.24 10*3/mm3      Lymphocytes, Absolute 1.12 10*3/mm3      Monocytes, Absolute 1.31 10*3/mm3      Eosinophils, Absolute 0.20 10*3/mm3      Basophils, Absolute 0.08 10*3/mm3      Immature Grans, Absolute 0.05 10*3/mm3      nRBC 0.0 /100 WBC     Protime-INR [[id number]]  (Abnormal) Collected: 09/17/21 0714    Specimen: Blood Updated: 09/17/21 0813     Protime 18.8 Seconds      INR 1.59    Magnesium [[id number]]  (Normal) Collected: 09/17/21 0714    Specimen: Blood Updated: 09/17/21 0832     Magnesium 2.0 mg/dL     Troponin [[id number]]  (Normal) Collected: 09/17/21 0714    Specimen: Blood Updated: 09/17/21 0826     Troponin T 0.020 ng/mL     Narrative:      Troponin T Reference Range:  <= 0.03 ng/mL-    Negative for AMI  >0.03 ng/mL-     Abnormal for myocardial necrosis.  Clinicians would have to utilize clinical acumen, EKG, Troponin and serial changes to determine if it is an Acute Myocardial Infarction or myocardial injury due to an underlying chronic condition.       Results may be falsely decreased if patient taking Biotin.      POC Glucose Once [[id number]]  (Abnormal) Collected: 09/17/21 1123    Specimen: Blood Updated: 09/17/21 1125     Glucose 145 mg/dL      Comment: Meter: [id number] : [id number] Vashtiessieclintchloe Gates SAMEER             Imaging Results (Last 24 Hours)     Procedure Component Value Units Date/Time    XR Chest 1 View [[id number]] Collected: 09/17/21 0042     Updated: 09/17/21 0042    Narrative:        Patient: FELIPE DOCKERY  Time Out: 00:41  Exam(s): FILM CXR 1 VIEW     EXAM:    XR Chest, 1 View    CLINICAL HISTORY:     Reason for exam: Fever, weakness.    TECHNIQUE:    Frontal view of the chest.    COMPARISON:    Chest x-ray 5 15 2021    FINDINGS:    Lungs:  Subtle opacity over the periphery of the left midlung.    Pleural space:  No pleural effusion. No pneumothorax.    Heart:  Unremarkable.  No cardiomegaly.    IMPRESSION:         Subtle opacity over the periphery of the left midlung may represent   infection in the appropriate clinical setting.    Impression:          Electronically signed by Dejuan Rajan MD on 09-17-21 at 0041          Results for orders placed during the hospital encounter of 08/16/20    Adult Transthoracic Echo Complete W/ Cont if Necessary Per Protocol    Interpretation Summary  · Left ventricle not well visualized. Calculated EF = 60%. Estimated EF was in agreement with the calculated EF. Estimated EF appears to be in the range of 56 - 60%. Wall motion assessment is limited but the left ventricular systolic function appears to be normal. Normal left ventricular cavity size noted. Wall motion assessment is limited Left ventricular wall thickness is consistent with  moderate concentric hypertrophy. Left ventricular diastolic function was indeterminate.  · The valve was poorly visualized but appears trileaflet. The aortic valve is abnormal in structure. There is moderate thickening of the aortic valve. No aortic valve regurgitation is present. No aortic valve stenosis is present  · Mild tricuspid valve regurgitation is present. Estimated right ventricular systolic pressure from tricuspid regurgitation is mildly elevated (35-45 mmHg). Calculated right ventricular systolic pressure from tricuspid regurgitation is 41 mmHg.  · Limited 2D imaging of cardiac valves      ECG 12 Lead   Preliminary Result   HEART RATE= 74  bpm   RR Interval= 812  ms   VA Interval= 219  ms   P Horizontal Axis= 63  deg   P Front Axis= -16  deg   QRSD Interval= 174  ms   QT Interval= 464  ms   QRS Axis= -26  deg   T Wave Axis= 100  deg   - ABNORMAL ECG -   Sinus rhythm   Borderline prolonged VA interval   Left bundle branch block   ST elevation secondary to IVCD   Electronically Signed By:    Date and Time of Study: 2021-09-17 00:16:22           Assessment/Plan     Active Hospital Problems    Diagnosis  POA   • **Pneumonia of left lung due to infectious organism [J18.9]  Yes   • Bradycardia [R00.1]  No   • CKD (chronic kidney disease) stage 2, GFR 60-89 ml/min [N18.2]  Yes   • Chronic diastolic congestive heart failure (CMS/HCC) [I50.32]  Yes   • History of CVA (cerebrovascular accident) [Z86.73]  Not Applicable   • Atrial flutter (CMS/HCC) [I48.92]  Yes   • Fever in adult [R50.9]  No   • Class 2 severe obesity due to excess calories with serious comorbidity and body mass index (BMI) of 36.0 to 36.9 in adult (CMS/HCC) [E66.01, Z68.36]  Not Applicable   • Hyperlipidemia [E78.5]  Yes   • Polycythemia vera (CMS/HCC) [D45]  Yes   • TIA (transient ischemic attack) [G45.9]  Yes   • Supraventricular tachycardia (CMS/HCC) [I47.1]  Yes   • Type 2 diabetes mellitus with both eyes affected by mild nonproliferative  retinopathy without macular edema, without long-term current use of insulin (CMS/[facility]) [E11.3293]  Yes   • Hypertension [I10]  Yes   • Coronary artery disease [I25.10]  Yes      Resolved Hospital Problems   No resolved problems to display.       Mr. Blanco is a 76 y.o. former smoker with a history of polycythemia vera, hypertension, chronic diastolic congestive heart failure, hyperlipidemia, history of CVA, type 2 diabetes mellitus, atrial flutter, chronic anticoagulation (Eliquis) who presents to [facility] ER chief complaint of fatigue, fever and admitted for suspected pneumonia of left lung due to infectious organism.      Pneumonia of left lung due to infectious organism /fever in adult  Diaphoretic-->continue gentle IV fluid hydration & monitor for fluid volume overload  T-max 101.3  O2 saturation 93% room air  Blood cultures x2 pending  COVID-19 negative  Respiratory viral panel pending  Ordering incentive spirometry  Recent exposure to COVID-19 and reportedly completed Pfizer vaccine   Ordering Mucinex  Continue empiric therapy IV ceftriaxone and azithromycin  Flonase      Atrial flutter (CMS/HCC) /bradycardia / Supraventricular tachycardia (CMS/HCC) /hypertension /chronic diastolic congestive heart failure  Initial tachycardia noted on arrival now with bradycardia yet hemodynamically stable  Home medications--carvedilol, diltiazem (continued with hold parameters), losartan, hydralazine  Apixaban continued      Type 2 diabetes mellitus with both eyes affected by mild nonproliferative retinopathy without macular edema, without long-term current use of insulin (CMS/HCC)  Glucose acceptable, consistent  Low-dose lispro sliding scale 3 times daily before meals       Coronary artery disease  Troponin trending upward:  0.0.174-->0.020, repeat for trend  Denies chest pain  EKG sinus rhythm, prolonged AMI, LBBB, ST elevation secondary to IVCD  ASA continued      TIA (transient ischemic attack) /history of CVA  No  evidence of residual weakness or lateralizing features on exam      Hyperlipidemia    Class 2 severe obesity due to excess calories with serious comorbidity and body mass index (BMI) of 36.0 to 36.9 in adult (CMS/HCC)    Polycythemia vera (CMS/HCC)  Serum hemoglobin 12-->no current indication for therapeutic phlebotomy  Hydroxyurea continued      CKD (chronic kidney disease) stage 2, GFR 60-89 ml/min  Serum creatinine appears at baseline  Avoid nephrotoxins  Monitor labs      · I discussed the patient's findings and my recommendations with Dr. Sandoval.    VTE Prophylaxis - apixaban (home med)   Code Status - CPR       MUKUL Bernabe  [facility]  09/17/21  12:15 EDT

## 2021-11-10 ENCOUNTER — TELEPHONE (OUTPATIENT)
Dept: INTERNAL MEDICINE CLINIC | Facility: CLINIC | Age: 61
End: 2021-11-10

## 2021-11-10 DIAGNOSIS — E55.9 VITAMIN D DEFICIENCY: Primary | ICD-10-CM

## 2021-11-10 NOTE — TELEPHONE ENCOUNTER
Patient finished the course for the vitamin D. Wants to know if he needs to get labs done now or wait till he comes in for his follow up appointment in January. Please call back, will need order.

## 2021-11-11 NOTE — TELEPHONE ENCOUNTER
He can get the blood work done now. I have ordered the vitamin D level. It is a nonfasting specimen.

## 2021-11-16 NOTE — TELEPHONE ENCOUNTER
Patient advised of lab order from Dr Kashmir Moss and of lipid panel order(due 12/10/2021) in system from his cardiologist which is fasting. Patient verbalized understanding and will get them both done.

## 2021-12-29 ENCOUNTER — LAB ENCOUNTER (OUTPATIENT)
Dept: LAB | Age: 61
End: 2021-12-29
Attending: INTERNAL MEDICINE
Payer: COMMERCIAL

## 2021-12-29 DIAGNOSIS — E55.9 VITAMIN D DEFICIENCY: ICD-10-CM

## 2021-12-29 DIAGNOSIS — E78.00 HYPERCHOLESTEROLEMIA: ICD-10-CM

## 2021-12-29 LAB
CHOLEST SERPL-MCNC: 223 MG/DL (ref ?–200)
FASTING PATIENT LIPID ANSWER: YES
HDLC SERPL-MCNC: 37 MG/DL (ref 40–59)
LDLC SERPL CALC-MCNC: 160 MG/DL (ref ?–100)
NONHDLC SERPL-MCNC: 186 MG/DL (ref ?–130)
TRIGL SERPL-MCNC: 142 MG/DL (ref 30–149)
VIT D+METAB SERPL-MCNC: 19.9 NG/ML (ref 30–100)
VLDLC SERPL CALC-MCNC: 28 MG/DL (ref 0–30)

## 2021-12-29 PROCEDURE — 82306 VITAMIN D 25 HYDROXY: CPT

## 2021-12-29 PROCEDURE — 36415 COLL VENOUS BLD VENIPUNCTURE: CPT

## 2021-12-29 PROCEDURE — 80061 LIPID PANEL: CPT

## 2022-01-17 ENCOUNTER — OFFICE VISIT (OUTPATIENT)
Dept: INTERNAL MEDICINE CLINIC | Facility: CLINIC | Age: 62
End: 2022-01-17
Payer: COMMERCIAL

## 2022-01-17 VITALS
TEMPERATURE: 98 F | SYSTOLIC BLOOD PRESSURE: 132 MMHG | RESPIRATION RATE: 18 BRPM | DIASTOLIC BLOOD PRESSURE: 86 MMHG | BODY MASS INDEX: 28.63 KG/M2 | WEIGHT: 200 LBS | HEART RATE: 60 BPM | HEIGHT: 70 IN

## 2022-01-17 DIAGNOSIS — D23.9 BENIGN NEOPLASM OF SKIN, UNSPECIFIED LOCATION: ICD-10-CM

## 2022-01-17 DIAGNOSIS — I42.0 DILATED CARDIOMYOPATHY (HCC): ICD-10-CM

## 2022-01-17 DIAGNOSIS — E55.9 VITAMIN D DEFICIENCY: Primary | ICD-10-CM

## 2022-01-17 DIAGNOSIS — G47.33 OSA (OBSTRUCTIVE SLEEP APNEA): ICD-10-CM

## 2022-01-17 DIAGNOSIS — E78.5 HYPERLIPIDEMIA, UNSPECIFIED HYPERLIPIDEMIA TYPE: ICD-10-CM

## 2022-01-17 PROCEDURE — 3079F DIAST BP 80-89 MM HG: CPT | Performed by: INTERNAL MEDICINE

## 2022-01-17 PROCEDURE — 99214 OFFICE O/P EST MOD 30 MIN: CPT | Performed by: INTERNAL MEDICINE

## 2022-01-17 PROCEDURE — 3075F SYST BP GE 130 - 139MM HG: CPT | Performed by: INTERNAL MEDICINE

## 2022-01-17 PROCEDURE — 3008F BODY MASS INDEX DOCD: CPT | Performed by: INTERNAL MEDICINE

## 2022-01-17 RX ORDER — ERGOCALCIFEROL 1.25 MG/1
50000 CAPSULE ORAL WEEKLY
Qty: 12 CAPSULE | Refills: 1 | Status: SHIPPED | OUTPATIENT
Start: 2022-01-17

## 2022-01-17 NOTE — PROGRESS NOTES
HPI:    Patient ID: Catherine Westfall is a 63year old male.     HPI      Patient Active Problem List:     Left bundle branch block     Dilated cardiomyopathy ([facility] [address]     Hypercholesterolemia         HISTORY:  Past Medical History:   Diagnosis Date   • Allergy Temp 97.6 °F (36.4 °C) (Other)   Resp 18   Ht 5' 10\" (1.778 m)   Wt 200 lb (90.7 kg)   BMI 28.70 kg/m²      Physical Exam  Constitutional:       Appearance: He is well-developed.    HENT:      Nose: Nose normal.      Mouth/Throat:      Pharynx: No orophary hyperlipidemia type  Just started the rosuvastatin 20 mg a day from the cardiologist.  Will be seeing the cardiologist in about a month. They can order repeat studies at that time. Work on diet and exercise.     4. Dilated cardiomyopathy (HCC)  No evidenc

## 2022-02-07 ENCOUNTER — APPOINTMENT (OUTPATIENT)
Dept: URBAN - METROPOLITAN AREA CLINIC 321 | Age: 62
Setting detail: DERMATOLOGY
End: 2022-02-09

## 2022-02-07 DIAGNOSIS — D22 MELANOCYTIC NEVI: ICD-10-CM

## 2022-02-07 DIAGNOSIS — L81.4 OTHER MELANIN HYPERPIGMENTATION: ICD-10-CM

## 2022-02-07 DIAGNOSIS — L30.0 NUMMULAR DERMATITIS: ICD-10-CM

## 2022-02-07 DIAGNOSIS — L84 CORNS AND CALLOSITIES: ICD-10-CM

## 2022-02-07 DIAGNOSIS — L82.1 OTHER SEBORRHEIC KERATOSIS: ICD-10-CM

## 2022-02-07 DIAGNOSIS — L82.0 INFLAMED SEBORRHEIC KERATOSIS: ICD-10-CM

## 2022-02-07 PROBLEM — D22.5 MELANOCYTIC NEVI OF TRUNK: Status: ACTIVE | Noted: 2022-02-07

## 2022-02-07 PROCEDURE — 17110 DESTRUCT B9 LESION 1-14: CPT

## 2022-02-07 PROCEDURE — 99214 OFFICE O/P EST MOD 30 MIN: CPT | Mod: 25

## 2022-02-07 PROCEDURE — OTHER PRESCRIPTION: OTHER

## 2022-02-07 PROCEDURE — OTHER LIQUID NITROGEN: OTHER

## 2022-02-07 PROCEDURE — OTHER COUNSELING: OTHER

## 2022-02-07 RX ORDER — FLUOCINONIDE 0.5 MG/G
OINTMENT TOPICAL
Qty: 120 | Refills: 1 | Status: ERX | COMMUNITY
Start: 2022-02-07

## 2022-02-07 ASSESSMENT — LOCATION DETAILED DESCRIPTION DERM
LOCATION DETAILED: LEFT MEDIAL UPPER BACK
LOCATION DETAILED: LEFT PROXIMAL PRETIBIAL REGION
LOCATION DETAILED: UPPER STERNUM
LOCATION DETAILED: LEFT MEDIAL PLANTAR 1ST TOE
LOCATION DETAILED: RIGHT RADIAL DORSAL HAND
LOCATION DETAILED: RIGHT PROXIMAL PRETIBIAL REGION
LOCATION DETAILED: INFERIOR THORACIC SPINE
LOCATION DETAILED: RIGHT SUPERIOR MEDIAL UPPER BACK

## 2022-02-07 ASSESSMENT — LOCATION SIMPLE DESCRIPTION DERM
LOCATION SIMPLE: PLANTAR SURFACE OF LEFT 1ST TOE
LOCATION SIMPLE: CHEST
LOCATION SIMPLE: RIGHT PRETIBIAL REGION
LOCATION SIMPLE: RIGHT HAND
LOCATION SIMPLE: LEFT PRETIBIAL REGION
LOCATION SIMPLE: UPPER BACK
LOCATION SIMPLE: RIGHT UPPER BACK
LOCATION SIMPLE: LEFT UPPER BACK

## 2022-02-07 ASSESSMENT — LOCATION ZONE DERM
LOCATION ZONE: HAND
LOCATION ZONE: TOE
LOCATION ZONE: LEG
LOCATION ZONE: TRUNK

## 2022-02-07 NOTE — PROCEDURE: LIQUID NITROGEN
Show Applicator Variable?: Yes
Spray Paint Technique: No
Medical Necessity Clause: This procedure was medically necessary because the lesions that were treated were:
Number Of Freeze-Thaw Cycles: 1 freeze-thaw cycle
Consent: The patient's consent was obtained including but not limited to risks of crusting, scabbing, blistering, scarring, darker or lighter pigmentary change, recurrence, incomplete removal/need for repeat treatments, and infection. Patient understands that treatment on feet/hands may limit daily activities if pain is significant after treatment and/or if blistering occurs.
Post-Care Instructions: I reviewed with the patient in detail post-care instructions. Patient is to wear sunprotection, and avoid picking at any of the treated lesions. Pt may apply Vaseline to crusted or scabbing areas. If blistering occurs, pt understands to not pop blister and can cover with Vaseline + Band-Aid. If any topicals are prescribed (i.e wart peel or topical S.A), pt is to wait 5-7 days before applying to treated areas.
Spray Paint Text: The liquid nitrogen was applied to the skin utilizing a spray paint frosting technique.
Medical Necessity Information: It is in your best interest to select a reason for this procedure from the list below. All of these items fulfill various CMS LCD requirements except the new and changing color options.
Application Tool (Optional): Liquid Nitrogen Sprayer
Duration Of Freeze Thaw-Cycle (Seconds): 5-10
Detail Level: Detailed

## 2022-04-07 ENCOUNTER — APPOINTMENT (OUTPATIENT)
Dept: URBAN - METROPOLITAN AREA CLINIC 244 | Age: 62
Setting detail: DERMATOLOGY
End: 2022-04-08

## 2022-04-07 DIAGNOSIS — L82.0 INFLAMED SEBORRHEIC KERATOSIS: ICD-10-CM

## 2022-04-07 DIAGNOSIS — L30.0 NUMMULAR DERMATITIS: ICD-10-CM

## 2022-04-07 PROCEDURE — 99213 OFFICE O/P EST LOW 20 MIN: CPT

## 2022-04-07 PROCEDURE — OTHER COUNSELING: OTHER

## 2022-04-07 PROCEDURE — OTHER PRESCRIPTION MEDICATION MANAGEMENT: OTHER

## 2022-04-07 ASSESSMENT — LOCATION SIMPLE DESCRIPTION DERM
LOCATION SIMPLE: LEFT PRETIBIAL REGION
LOCATION SIMPLE: RIGHT PRETIBIAL REGION
LOCATION SIMPLE: RIGHT HAND

## 2022-04-07 ASSESSMENT — LOCATION DETAILED DESCRIPTION DERM
LOCATION DETAILED: RIGHT RADIAL DORSAL HAND
LOCATION DETAILED: LEFT PROXIMAL PRETIBIAL REGION
LOCATION DETAILED: RIGHT PROXIMAL PRETIBIAL REGION

## 2022-04-07 ASSESSMENT — LOCATION ZONE DERM
LOCATION ZONE: HAND
LOCATION ZONE: LEG

## 2022-04-07 NOTE — PROCEDURE: PRESCRIPTION MEDICATION MANAGEMENT
Render In Strict Bullet Format?: No
Continue Regimen: Topical steroid prn for flares not to be used constantly only on and off for flares
Detail Level: Zone

## 2022-09-03 ENCOUNTER — LAB ENCOUNTER (OUTPATIENT)
Dept: LAB | Age: 62
End: 2022-09-03
Attending: INTERNAL MEDICINE
Payer: COMMERCIAL

## 2022-09-03 DIAGNOSIS — E78.00 HYPERCHOLESTEROLEMIA: ICD-10-CM

## 2022-09-03 LAB
ALBUMIN SERPL-MCNC: 3.6 G/DL (ref 3.4–5)
ALP LIVER SERPL-CCNC: 60 U/L
ALT SERPL-CCNC: 44 U/L
AST SERPL-CCNC: 22 U/L (ref 15–37)
BILIRUB DIRECT SERPL-MCNC: 0.2 MG/DL (ref 0–0.2)
BILIRUB SERPL-MCNC: 0.7 MG/DL (ref 0.1–2)
CHOLEST SERPL-MCNC: 110 MG/DL (ref ?–200)
FASTING PATIENT LIPID ANSWER: YES
HDLC SERPL-MCNC: 37 MG/DL (ref 40–59)
LDLC SERPL CALC-MCNC: 58 MG/DL (ref ?–100)
NONHDLC SERPL-MCNC: 73 MG/DL (ref ?–130)
PROT SERPL-MCNC: 6.7 G/DL (ref 6.4–8.2)
TRIGL SERPL-MCNC: 72 MG/DL (ref 30–149)
VLDLC SERPL CALC-MCNC: 11 MG/DL (ref 0–30)

## 2022-09-03 PROCEDURE — 80076 HEPATIC FUNCTION PANEL: CPT

## 2022-09-03 PROCEDURE — 80061 LIPID PANEL: CPT

## 2022-09-03 PROCEDURE — 36415 COLL VENOUS BLD VENIPUNCTURE: CPT

## 2022-11-16 RX ORDER — ALBUTEROL SULFATE 90 UG/1
2 AEROSOL, METERED RESPIRATORY (INHALATION) EVERY 4 HOURS PRN
Qty: 1 EACH | Refills: 6 | Status: SHIPPED | OUTPATIENT
Start: 2022-11-16

## 2023-08-09 ENCOUNTER — LAB ENCOUNTER (OUTPATIENT)
Dept: LAB | Age: 63
End: 2023-08-09
Attending: INTERNAL MEDICINE
Payer: COMMERCIAL

## 2023-08-09 DIAGNOSIS — E78.00 HYPERCHOLESTEROLEMIA: Primary | ICD-10-CM

## 2023-08-09 LAB
ALBUMIN SERPL-MCNC: 4 G/DL (ref 3.4–5)
ALP LIVER SERPL-CCNC: 63 U/L
ALT SERPL-CCNC: 32 U/L
AST SERPL-CCNC: 18 U/L (ref 15–37)
BILIRUB DIRECT SERPL-MCNC: 0.2 MG/DL (ref 0–0.2)
BILIRUB SERPL-MCNC: 0.8 MG/DL (ref 0.1–2)
CHOLEST SERPL-MCNC: 123 MG/DL (ref ?–200)
FASTING PATIENT LIPID ANSWER: YES
HDLC SERPL-MCNC: 37 MG/DL (ref 40–59)
LDLC SERPL CALC-MCNC: 68 MG/DL (ref ?–100)
NONHDLC SERPL-MCNC: 86 MG/DL (ref ?–130)
PROT SERPL-MCNC: 7.1 G/DL (ref 6.4–8.2)
TRIGL SERPL-MCNC: 91 MG/DL (ref 30–149)
VLDLC SERPL CALC-MCNC: 14 MG/DL (ref 0–30)

## 2023-08-09 PROCEDURE — 80076 HEPATIC FUNCTION PANEL: CPT

## 2023-08-09 PROCEDURE — 80061 LIPID PANEL: CPT

## 2023-08-09 PROCEDURE — 36415 COLL VENOUS BLD VENIPUNCTURE: CPT

## 2023-08-28 ENCOUNTER — LAB ENCOUNTER (OUTPATIENT)
Dept: LAB | Age: 63
End: 2023-08-28
Attending: NURSE PRACTITIONER
Payer: COMMERCIAL

## 2023-08-28 ENCOUNTER — OFFICE VISIT (OUTPATIENT)
Dept: INTERNAL MEDICINE CLINIC | Facility: CLINIC | Age: 63
End: 2023-08-28

## 2023-08-28 VITALS
BODY MASS INDEX: 28.77 KG/M2 | HEART RATE: 63 BPM | SYSTOLIC BLOOD PRESSURE: 109 MMHG | WEIGHT: 201 LBS | DIASTOLIC BLOOD PRESSURE: 67 MMHG | RESPIRATION RATE: 16 BRPM | HEIGHT: 70 IN

## 2023-08-28 DIAGNOSIS — E78.00 HYPERCHOLESTEROLEMIA: ICD-10-CM

## 2023-08-28 DIAGNOSIS — Z00.00 ANNUAL PHYSICAL EXAM: ICD-10-CM

## 2023-08-28 DIAGNOSIS — Z12.11 COLON CANCER SCREENING: ICD-10-CM

## 2023-08-28 DIAGNOSIS — Z12.5 PROSTATE CANCER SCREENING: ICD-10-CM

## 2023-08-28 DIAGNOSIS — G47.33 OSA (OBSTRUCTIVE SLEEP APNEA): ICD-10-CM

## 2023-08-28 DIAGNOSIS — Z00.00 ANNUAL PHYSICAL EXAM: Primary | ICD-10-CM

## 2023-08-28 DIAGNOSIS — E55.9 VITAMIN D DEFICIENCY: ICD-10-CM

## 2023-08-28 DIAGNOSIS — I42.0 DILATED CARDIOMYOPATHY (HCC): ICD-10-CM

## 2023-08-28 LAB
ALBUMIN SERPL-MCNC: 3.6 G/DL (ref 3.4–5)
ALBUMIN/GLOB SERPL: 1.2 {RATIO} (ref 1–2)
ALP LIVER SERPL-CCNC: 63 U/L
ALT SERPL-CCNC: 33 U/L
ANION GAP SERPL CALC-SCNC: 4 MMOL/L (ref 0–18)
AST SERPL-CCNC: 21 U/L (ref 15–37)
BILIRUB SERPL-MCNC: 0.7 MG/DL (ref 0.1–2)
BUN BLD-MCNC: 15 MG/DL (ref 7–18)
BUN/CREAT SERPL: 16.7 (ref 10–20)
CALCIUM BLD-MCNC: 8.7 MG/DL (ref 8.5–10.1)
CHLORIDE SERPL-SCNC: 111 MMOL/L (ref 98–112)
CO2 SERPL-SCNC: 26 MMOL/L (ref 21–32)
COMPLEXED PSA SERPL-MCNC: 1.35 NG/ML (ref ?–4)
CREAT BLD-MCNC: 0.9 MG/DL
DEPRECATED RDW RBC AUTO: 41.1 FL (ref 35.1–46.3)
EGFRCR SERPLBLD CKD-EPI 2021: 97 ML/MIN/1.73M2 (ref 60–?)
ERYTHROCYTE [DISTWIDTH] IN BLOOD BY AUTOMATED COUNT: 12.5 % (ref 11–15)
EST. AVERAGE GLUCOSE BLD GHB EST-MCNC: 105 MG/DL (ref 68–126)
FASTING STATUS PATIENT QL REPORTED: NO
GLOBULIN PLAS-MCNC: 3 G/DL (ref 2.8–4.4)
GLUCOSE BLD-MCNC: 97 MG/DL (ref 70–99)
HBA1C MFR BLD: 5.3 % (ref ?–5.7)
HCT VFR BLD AUTO: 38.9 %
HGB BLD-MCNC: 13 G/DL
MCH RBC QN AUTO: 30.3 PG (ref 26–34)
MCHC RBC AUTO-ENTMCNC: 33.4 G/DL (ref 31–37)
MCV RBC AUTO: 90.7 FL
OSMOLALITY SERPL CALC.SUM OF ELEC: 293 MOSM/KG (ref 275–295)
PLATELET # BLD AUTO: 181 10(3)UL (ref 150–450)
POTASSIUM SERPL-SCNC: 4.3 MMOL/L (ref 3.5–5.1)
PROT SERPL-MCNC: 6.6 G/DL (ref 6.4–8.2)
RBC # BLD AUTO: 4.29 X10(6)UL
SODIUM SERPL-SCNC: 141 MMOL/L (ref 136–145)
TSI SER-ACNC: 0.39 MIU/ML (ref 0.36–3.74)
VIT D+METAB SERPL-MCNC: 23.7 NG/ML (ref 30–100)
WBC # BLD AUTO: 6.9 X10(3) UL (ref 4–11)

## 2023-08-28 PROCEDURE — 36415 COLL VENOUS BLD VENIPUNCTURE: CPT

## 2023-08-28 PROCEDURE — 99396 PREV VISIT EST AGE 40-64: CPT | Performed by: NURSE PRACTITIONER

## 2023-08-28 PROCEDURE — 80053 COMPREHEN METABOLIC PANEL: CPT

## 2023-08-28 PROCEDURE — 82306 VITAMIN D 25 HYDROXY: CPT

## 2023-08-28 PROCEDURE — 3074F SYST BP LT 130 MM HG: CPT | Performed by: NURSE PRACTITIONER

## 2023-08-28 PROCEDURE — 3078F DIAST BP <80 MM HG: CPT | Performed by: NURSE PRACTITIONER

## 2023-08-28 PROCEDURE — 3008F BODY MASS INDEX DOCD: CPT | Performed by: NURSE PRACTITIONER

## 2023-08-28 PROCEDURE — 84443 ASSAY THYROID STIM HORMONE: CPT

## 2023-08-28 PROCEDURE — 83036 HEMOGLOBIN GLYCOSYLATED A1C: CPT

## 2023-08-28 PROCEDURE — 85027 COMPLETE CBC AUTOMATED: CPT

## 2023-08-28 RX ORDER — ERGOCALCIFEROL 1.25 MG/1
50000 CAPSULE ORAL
Qty: 12 CAPSULE | Refills: 0 | Status: SHIPPED | OUTPATIENT
Start: 2023-08-28 | End: 2023-11-26

## 2023-11-17 ENCOUNTER — IMMUNIZATION (OUTPATIENT)
Dept: LAB | Age: 63
End: 2023-11-17
Attending: EMERGENCY MEDICINE
Payer: COMMERCIAL

## 2023-11-17 DIAGNOSIS — Z23 NEED FOR VACCINATION: Primary | ICD-10-CM

## 2023-11-17 PROCEDURE — 90480 ADMN SARSCOV2 VAC 1/ONLY CMP: CPT

## 2023-11-17 PROCEDURE — 90686 IIV4 VACC NO PRSV 0.5 ML IM: CPT

## 2023-11-17 PROCEDURE — 90471 IMMUNIZATION ADMIN: CPT

## 2024-10-17 ENCOUNTER — TELEPHONE (OUTPATIENT)
Dept: GASTROENTEROLOGY | Facility: CLINIC | Age: 64
End: 2024-10-17

## 2024-10-17 NOTE — TELEPHONE ENCOUNTER
----- Message from Natividad GIRALDO sent at 9/23/2015  9:16 AM CDT -----  Regarding: Recall colon   Recall patient for colon in 10 years per CB.

## 2024-11-12 ENCOUNTER — OFFICE VISIT (OUTPATIENT)
Dept: INTERNAL MEDICINE CLINIC | Facility: CLINIC | Age: 64
End: 2024-11-12

## 2024-11-12 ENCOUNTER — NURSE TRIAGE (OUTPATIENT)
Dept: INTERNAL MEDICINE CLINIC | Facility: CLINIC | Age: 64
End: 2024-11-12

## 2024-11-12 VITALS
WEIGHT: 205.81 LBS | SYSTOLIC BLOOD PRESSURE: 124 MMHG | DIASTOLIC BLOOD PRESSURE: 76 MMHG | BODY MASS INDEX: 29.46 KG/M2 | OXYGEN SATURATION: 98 % | HEIGHT: 70 IN | HEART RATE: 65 BPM

## 2024-11-12 DIAGNOSIS — S20.362A TICK BITE OF LEFT FRONT WALL OF THORAX, INITIAL ENCOUNTER: Primary | ICD-10-CM
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
## 2024-11-12 PROCEDURE — 99213 OFFICE O/P EST LOW 20 MIN: CPT | Performed by: INTERNAL MEDICINE

## 2024-11-12 RX ORDER — DOXYCYCLINE 100 MG/1
400 CAPSULE ORAL ONCE
Qty: 4 CAPSULE | Refills: 0 | Status: SHIPPED | OUTPATIENT
Start: 2024-11-12 | End: 2024-11-12

## 2024-11-12 NOTE — PROGRESS NOTES
Jim Vizcarra is a 64 year old male with complaints of:  Chief Complaint: Tick (Pt found tick on his abdomen today) and Derm Problem (Dry patch on his back he wants to get looked at)    HPI     Jim Vizcarra is a(n) 64 year old male with a history of dilated cardiomyopathy, LBBB, HLD, who presents for tick bite.     Patient noted a tick on his torso this morning. Unknown how long it had been attached for. He has brought the tick in with him.    Past Medical History     Past Medical History:    Allergy    Appendicitis    Dilated cardiomyopathy (HCC)    H/O hernia repair    Poison ivy dermatitis    S/P tonsillectomy        Past Surgical History     Past Surgical History:   Procedure Laterality Date    Appendectomy      Vasectomy          Family History     Family History   Problem Relation Age of Onset    Obesity Father     Pulmonary Disease Father         COPD    Lung Disorder Mother         EMPHYSEMA, CAUSE OF DEATH    Depression Sister        Social History     Social History     Socioeconomic History    Marital status:    Tobacco Use    Smoking status: Former     Current packs/day: 0.00     Types: Cigarettes     Quit date:      Years since quittin.8    Smokeless tobacco: Never   Vaping Use    Vaping status: Never Used   Substance and Sexual Activity    Alcohol use: Yes     Comment: BEER&WINE 2 DRINKS DAILY    Drug use: No    Sexual activity: Not Currently     Partners: Female   Other Topics Concern    Caffeine Concern Yes     Comment: COFFEE 3 CUPS/DAY       Allergies     Allergies[1]    Current Medications     Current Outpatient Medications   Medication Sig Dispense Refill    albuterol (PROAIR HFA) 108 (90 Base) MCG/ACT Inhalation Aero Soln Inhale 2 puffs into the lungs every 4 (four) hours as needed for Wheezing. 1 each 6    rosuvastatin (CRESTOR) 20 MG Oral Tab Take 1 tablet (20 mg total) by mouth daily. 90 tablet 1    carvedilol 25 MG Oral Tab Take 1 tablet (25 mg total) by mouth 2  (two) times daily. 180 tablet 3    Losartan Potassium 25 MG Oral Tab Take 1 tablet (25 mg total) by mouth daily. 90 tablet 3    Spacer/Aero-Holding Chambers (AEROCHAMBER MV) Does not apply Misc Use with inhaler 1 each 0    fexofenadine 180 MG Oral Tab Take 1 tablet (180 mg total) by mouth as needed.       No current facility-administered medications for this visit.       Review of Systems     GENERAL HEALTH: feels well otherwise  SKIN: denies any unusual skin lesions or rashes  RESPIRATORY: denies shortness of breath with exertion  CARDIOVASCULAR: denies chest pain on exertion  GI: denies abdominal pain and denies heartburn  : denies any burning with urination, urinary frequency or urgency  NEURO: denies headaches, numbness or tingling, mental status changes  PSYCH: denies depressed mood, anxiety  MUSC: denies muscle aches, joint pain    Physical Exam     /76 (BP Location: Left arm, Patient Position: Sitting, Cuff Size: adult)   Pulse 65   Ht 5' 10\" (1.778 m)   Wt 205 lb 12.8 oz (93.4 kg)   SpO2 98%   BMI 29.53 kg/m²     GENERAL: well developed, well nourished,in no apparent distress  SKIN: no rashes,no suspicious lesions  HEENT: atraumatic, normocephalic,ears and throat are clear  NECK: supple,no adenopathy,no bruits  LUNGS: clear to auscultation  CARDIO: RRR without murmur  GI: good BS's,no masses, HSM or tenderness  EXTREMITIES: no cyanosis, clubbing or edema    Assessment and Plan     Diagnoses and all orders for this visit:    Tick bite of left front wall of thorax, initial encounter. Patient has brought the tick in with him, looks like Ixodes nymph. Will prophylax with doxycycline 400 mg x 1. RTO if targetoid rash or fevers develop. Can put steroid cream on tick bite as needed.   -     doxycycline 100 MG Oral Cap; Take 4 capsules (400 mg total) by mouth one time for 1 dose.          doxycycline 100 MG Oral Cap Take 4 capsules (400 mg total) by mouth one time for 1 dose. 4 capsule 0    albuterol  (PROAIR HFA) 108 (90 Base) MCG/ACT Inhalation Aero Soln Inhale 2 puffs into the lungs every 4 (four) hours as needed for Wheezing. 1 each 6    rosuvastatin (CRESTOR) 20 MG Oral Tab Take 1 tablet (20 mg total) by mouth daily. 90 tablet 1    carvedilol 25 MG Oral Tab Take 1 tablet (25 mg total) by mouth 2 (two) times daily. 180 tablet 3    Losartan Potassium 25 MG Oral Tab Take 1 tablet (25 mg total) by mouth daily. 90 tablet 3       Requested Prescriptions     Signed Prescriptions Disp Refills    doxycycline 100 MG Oral Cap 4 capsule 0     Sig: Take 4 capsules (400 mg total) by mouth one time for 1 dose.       No orders of the defined types were placed in this encounter.      No follow-ups on file.    The patient indicates understanding of these issues and agrees to the plan.    Electronically signed by Clemencia Hartley MD 11/12/24             [1]   Allergies  Allergen Reactions    Sulfa Antibiotics      Other reaction(s): Unknown

## 2024-11-12 NOTE — TELEPHONE ENCOUNTER
Action Requested: Summary for Provider     []  Critical Lab, Recommendations Needed  [] Need Additional Advice  []   FYI    []   Need Orders  [] Need Medications Sent to Pharmacy  []  Other     SUMMARY: Office visit    Future Appointments   Date Time Provider Department Center   11/12/2024  2:15 PM Clemencia Hartley MD [facility]     Reason for call: Tick  Onset: Data Unavailable    Patient had a sore spot on his abdomen. He pulled a tick off of his abdomen this morning. He denies fever. He does have the tick with him and states it does not look engorged.     Reason for Disposition   Red ring or bull's-eye rash occurs around a deer tick bite    Protocols used: Tick Bite-A-OH

## 2025-02-25 ENCOUNTER — TELEPHONE (OUTPATIENT)
Facility: CLINIC | Age: 65
End: 2025-02-25

## 2025-02-25 DIAGNOSIS — Z12.11 SCREEN FOR COLON CANCER: Primary | ICD-10-CM

## 2025-02-28 NOTE — TELEPHONE ENCOUNTER
Colon recall.     Reviewed allergies pharmacy, medications, medical/surgical history on file, and GI symptoms.     Please provide orders if ok to schedule directly.     Last Procedure, Date, MD:  11/17/2014, Colonoscopy, Dr Baptiste  Last Diagnosis:  internal hemorrhoids only  Recalled (mth/yrs): 7-10 years  Sedation Used Previously:  IV  Last Prep Used (if known):  Colyte  Quality Of Prep (if known): excellent  Anticoagulants:  Diuretics:   Diabetic Medication (Includes Insulin):   Weight loss Medication:   Iron/Herbal/Multivitamin Supplements:  Marijuana/Vaping/CBD:  Height/Weight:  BMI:  Hx of Cardiac &/or CVA Issues (MI/Stroke):  If yes, in the last 12 months?   Devices Pacemaker/Defibrillator/Stent(s):  Respiratory Issues/Oxygen Use/SORAIDA/COPD:  CiPAP/BiPAP:   Issues w/ Anesthesia:    Symptoms (Y/N):   Symptoms Details:     Special Comments/Notes:    Thank you!        Nikko Baptiste MD   Physician  Specialty: GASTROENTEROLOGY     Operative Report  Addendum     Encounter Date: 11/17/2014     Patient:   EMILYBONNIE #:   [id number]                    Room: BHUPENDRA Kirk #:  [id number]     ADMITTED:   11/17/2014        COLONOSCOPY PROCEDURE:     DATE:  11/17/14     SURGEON:  Nikko Baptiste MD     PROCEDURE:  Colonoscopy.     PREOP DIAGNOSIS:  Average risk screening colonoscopy examination.     POSTOP DIAGNOSIS:  Internal hemorrhoids.     SEDATION:  Fentanyl 100 mcg and Midazolam 8 mg given intravenously   before  and during the procedure.     COLONOSCOPY PROCEDURE:  After the nature and risks of a   colonoscopy  examination under conscious sedation were discussed with Mr. Vizcarra   and his  questions answered, his informed consent was obtained.  He was   sedated as  above.  Digital rectal exam was performed which showed no masses.    The  Olympus pediatric video colonoscope was placed in the patient's   rectum and  advanced under direct visualization without difficulty through the    entire  length of the colon up to the cecum and into the terminal ileum.    The cecum  was confirmed by landmarks including appendiceal orifice, cecal   strap,  ileocecal valve.  The quality of the prep was excellent.    Retroflexion was  performed in the rectum.     COLONOSCOPY FINDINGS:  The cecum, terminal ileum, ascending colon,   hepatic  flexure, transverse colon, splenic flexure, descending, sigmoid   and rectum  were entirely healthy and normal today.  No neoplasm seen today.    No  diverticula seen.  Retroflexion in the rectum showed small   internal  hemorrhoids.  The scope was straightened, air suctioned out, and   the scope  withdrawn from the patient.  He tolerated the procedure well.     IMPRESSION:  Internal hemorrhoids only on screening colonoscopy examination.     RECOMMENDATIONS:  1. High fiber diet.  2. Repeat screening colonoscopy examination in seven to ten years.

## 2025-03-05 NOTE — TELEPHONE ENCOUNTER
Colon recall.     Reviewed allergies pharmacy, medications, medical/surgical history on file, and GI symptoms.     Please provide orders if ok to schedule directly.     Last Procedure, Date, MD:  11/17/2014, Colonoscopy, Dr Baptiste  Last Diagnosis:  internal hemorrhoids  Recalled (mth/yrs): 10 years  Sedation Used Previously:  IV  Last Prep Used (if known):    Quality Of Prep (if known): excellent  Anticoagulants:no  Diuretics: no  Diabetic Medication (Includes Insulin): no  Weight loss Medication:   Iron/Herbal/Multivitamin Supplements:no  Marijuana/Vaping/CBD:  Height/Weight: 5'10\"/205  BMI:29.53  Hx of Cardiac &/or CVA Issues (MI/Stroke): no  If yes, in the last 12 months?   Devices Pacemaker/Defibrillator/Stent(s): no  Respiratory Issues/Oxygen Use/SORAIDA/COPD: no  CiPAP/BiPAP:   Issues w/ Anesthesia:no    Symptoms (Y/N): no  Symptoms Details: N/A    Special Comments/Notes: Pt has an annual check up scheduled with Dr Stafford on 07/25/2025. Last Annual check up was 08/28/2023 with Ayana MILES    Thank you!        Nikko Baptiste MD   Physician  Specialty: GASTROENTEROLOGY     Operative Report  Addendum     Encounter Date: 11/17/2014     Patient:   BONNIE VIZCARRA BETHANY PRITCHETT #:   [id number]                    Room: BHUPENDRA Kirk #:  [id number]     ADMITTED:   11/17/2014        COLONOSCOPY PROCEDURE:     DATE:  11/17/14     SURGEON:  Nikko Baptiste MD     PROCEDURE:  Colonoscopy.     PREOP DIAGNOSIS:  Average risk screening colonoscopy examination.     POSTOP DIAGNOSIS:  Internal hemorrhoids.     SEDATION:  Fentanyl 100 mcg and Midazolam 8 mg given intravenously   before  and during the procedure.     COLONOSCOPY PROCEDURE:  After the nature and risks of a   colonoscopy  examination under conscious sedation were discussed with Mr. Vizcarra   and his  questions answered, his informed consent was obtained.  He was   sedated as  above.  Digital rectal exam was performed which showed no masses.     The  Olympus pediatric video colonoscope was placed in the patient's   rectum and  advanced under direct visualization without difficulty through the   entire  length of the colon up to the cecum and into the terminal ileum.    The cecum  was confirmed by landmarks including appendiceal orifice, cecal   strap,  ileocecal valve.  The quality of the prep was excellent.    Retroflexion was  performed in the rectum.     COLONOSCOPY FINDINGS:  The cecum, terminal ileum, ascending colon,   hepatic  flexure, transverse colon, splenic flexure, descending, sigmoid   and rectum  were entirely healthy and normal today.  No neoplasm seen today.    No  diverticula seen.  Retroflexion in the rectum showed small   internal  hemorrhoids.  The scope was straightened, air suctioned out, and   the scope  withdrawn from the patient.  He tolerated the procedure well.     IMPRESSION:  Internal hemorrhoids only on screening colonoscopy examination.     RECOMMENDATIONS:  1. High fiber diet.  2. Repeat screening colonoscopy examination in seven to ten years.              D:    11/17/2014 3:55 P  T:    11/18/2014  7:52 A  ATTENDING:  Nikko Baptiste MD  DICTATING:    Nikko Baptiste MD MD ID #:      [id number]  cc:   Nikko Baptiste MD

## 2025-03-07 RX ORDER — SODIUM, POTASSIUM,MAG SULFATES 17.5-3.13G
SOLUTION, RECONSTITUTED, ORAL ORAL
Qty: 1 EACH | Refills: 0 | Status: SHIPPED | OUTPATIENT
Start: 2025-03-07

## 2025-03-07 NOTE — TELEPHONE ENCOUNTER
Thanks Warren!!    Chart reviewed.  Pt has an annual check up scheduled with Dr Stafford on 07/25/2025.     My previous colonoscopy procedure report of 11/17/2014 reviewed; internal hemorrhoids only on screening colonoscopy examination.      GI schedulers -    Please schedule colonoscopy exam at [facility] ([facility])    BMI Readings from Last 1 Encounters:   11/12/24 29.53 kg/m²     MAC anesthesia     BP Readings from Last 5 Encounters:   11/12/24 124/76   08/28/23 109/67   02/11/22 122/72   01/17/22 132/86   07/16/21 124/83       Suprep small volume bowel prep if covered by insurance, otherwise   Golytely (PEG) 4L bowel prep  Rx sent in to [facility]      DX = screening colonoscopy examination    Medication instructions:    None

## 2025-03-19 NOTE — TELEPHONE ENCOUNTER
2nd     Left message for patient to call back to schedule Colonoscopy.     Please transfer call to GI surgery scheduling

## 2025-03-19 NOTE — TELEPHONE ENCOUNTER
Per patient he needs a appointment in June   Scheduled for:  Colonoscopy [id number]  Provider Name:     Date:  Friday, 06/27/2025   Location:  [facility]   Sedation:  Mac   Time:  12;30 pm (pt is aware Endo will call with arrival time     Prep:  Golytely   Meds/Allergies Reconciled?:  Yes    Diagnosis with codes:   screening colonoscopy examination Z12.11  Was patient informed to call insurance with codes (Y/N):  Yes     Referral sent?:  Referral was sent at the time of electronic surgical scheduling.    EM or [facility] notified?:  I sent an electronic request to Endo Scheduling and received a confirmation today.       Medication Orders:  None  Misc Orders:  None     Further instructions given by staff:  I discussed the prep instructions with the patient which he verbally understood and is aware that I will mail the instructions today.

## 2025-06-27 ENCOUNTER — HOSPITAL ENCOUNTER (OUTPATIENT)
Age: 65
Setting detail: HOSPITAL OUTPATIENT SURGERY
Discharge: HOME | End: 2025-06-27
Attending: INTERNAL MEDICINE | Admitting: INTERNAL MEDICINE
Payer: COMMERCIAL

## 2025-06-27 ENCOUNTER — ANESTHESIA (OUTPATIENT)
Dept: ENDOSCOPY | Age: 65
End: 2025-06-27
Payer: COMMERCIAL

## 2025-06-27 ENCOUNTER — ANESTHESIA EVENT (OUTPATIENT)
Dept: ENDOSCOPY | Age: 65
End: 2025-06-27
Payer: COMMERCIAL

## 2025-06-27 VITALS
HEART RATE: 65 BPM | WEIGHT: 200 LBS | OXYGEN SATURATION: 99 % | SYSTOLIC BLOOD PRESSURE: 130 MMHG | HEIGHT: 70 IN | RESPIRATION RATE: 20 BRPM | BODY MASS INDEX: 28.63 KG/M2 | DIASTOLIC BLOOD PRESSURE: 90 MMHG

## 2025-06-27 DIAGNOSIS — Z12.11 SCREEN FOR COLON CANCER: ICD-10-CM

## 2025-06-27 PROCEDURE — 45385 COLONOSCOPY W/LESION REMOVAL: CPT | Performed by: INTERNAL MEDICINE

## 2025-06-27 PROCEDURE — 99070 SPECIAL SUPPLIES PHYS/QHP: CPT | Performed by: INTERNAL MEDICINE

## 2025-06-27 PROCEDURE — 88305 TISSUE EXAM BY PATHOLOGIST: CPT | Performed by: INTERNAL MEDICINE

## 2025-06-27 RX ORDER — NALOXONE HYDROCHLORIDE 0.4 MG/ML
0.08 INJECTION, SOLUTION INTRAMUSCULAR; INTRAVENOUS; SUBCUTANEOUS AS NEEDED
Status: DISCONTINUED | OUTPATIENT
Start: 2025-06-27 | End: 2025-06-27

## 2025-06-27 RX ORDER — ONDANSETRON 2 MG/ML
4 INJECTION INTRAMUSCULAR; INTRAVENOUS EVERY 6 HOURS PRN
Status: DISCONTINUED | OUTPATIENT
Start: 2025-06-27 | End: 2025-06-27

## 2025-06-27 RX ORDER — LIDOCAINE HYDROCHLORIDE 10 MG/ML
INJECTION, SOLUTION EPIDURAL; INFILTRATION; INTRACAUDAL; PERINEURAL AS NEEDED
Status: DISCONTINUED | OUTPATIENT
Start: 2025-06-27 | End: 2025-06-27 | Stop reason: SURG

## 2025-06-27 RX ORDER — PROCHLORPERAZINE EDISYLATE 5 MG/ML
5 INJECTION INTRAMUSCULAR; INTRAVENOUS EVERY 8 HOURS PRN
Status: DISCONTINUED | OUTPATIENT
Start: 2025-06-27 | End: 2025-06-27

## 2025-06-27 RX ORDER — SODIUM CHLORIDE, SODIUM LACTATE, POTASSIUM CHLORIDE, CALCIUM CHLORIDE 600; 310; 30; 20 MG/100ML; MG/100ML; MG/100ML; MG/100ML
INJECTION, SOLUTION INTRAVENOUS CONTINUOUS
Status: DISCONTINUED | OUTPATIENT
Start: 2025-06-27 | End: 2025-06-27

## 2025-06-27 RX ADMIN — LIDOCAINE HYDROCHLORIDE 50 MG: 10 INJECTION, SOLUTION EPIDURAL; INFILTRATION; INTRACAUDAL; PERINEURAL at 10:11:00

## 2025-06-27 RX ADMIN — SODIUM CHLORIDE, SODIUM LACTATE, POTASSIUM CHLORIDE, CALCIUM CHLORIDE: 600; 310; 30; 20 INJECTION, SOLUTION INTRAVENOUS at 09:21:00

## 2025-06-27 RX ADMIN — SODIUM CHLORIDE, SODIUM LACTATE, POTASSIUM CHLORIDE, CALCIUM CHLORIDE: 600; 310; 30; 20 INJECTION, SOLUTION INTRAVENOUS at 10:22:00

## 2025-06-27 RX ADMIN — SODIUM CHLORIDE, SODIUM LACTATE, POTASSIUM CHLORIDE, CALCIUM CHLORIDE: 600; 310; 30; 20 INJECTION, SOLUTION INTRAVENOUS at 10:11:00

## 2025-06-27 RX ADMIN — SODIUM CHLORIDE, SODIUM LACTATE, POTASSIUM CHLORIDE, CALCIUM CHLORIDE: 600; 310; 30; 20 INJECTION, SOLUTION INTRAVENOUS at 10:37:00

## 2025-06-27 NOTE — ANESTHESIA POSTPROCEDURE EVALUATION
Patient: Jim Vizcarra    Procedure Summary       Date: 06/27/25 Room / Location: [facility] ENDOSCOPY 01 / [facility] ENDO    Anesthesia Start: 1011 Anesthesia Stop: 1040    Procedure: COLONOSCOPY Diagnosis:       Screen for colon cancer      (polyps and hemorrhoids)    Surgeons: Kiran Maria MD Anesthesiologist: Davin Ornelas MD    Anesthesia Type: MAC ASA Status: 3            Anesthesia Type: MAC    Vitals Value Taken Time   /67 06/27/25 10:40   Temp pending 06/27/25 10:40   Pulse 70 06/27/25 10:40   Resp 18 06/27/25 10:40   SpO2 98 06/27/25 10:40       [facility] AN Post Evaluation:   Patient Evaluated in PACU  Patient Participation: complete - patient participated  Level of Consciousness: awake and alert  Pain Score: 0  Pain Management: adequate  Airway Patency:  Dental exam unchanged from preop  Yes    Nausea/Vomiting: none  Cardiovascular Status: acceptable, blood pressure returned to baseline and hemodynamically stable  Respiratory Status: acceptable  Postoperative Hydration acceptable  Comments: Wide awake, no recall, no nausea, BP and HR stable.  Voice and vision intact.      Davin Ornelas MD  6/27/2025 10:40 AM

## 2025-06-27 NOTE — OPERATIVE REPORT
[facility] Endoscopy Report  Date of procedure-June 27, 2025    Preoperative Diagnosis:  - Colon cancer screening    Postoperative Diagnosis:  - Colon polyps  - Diverticulosis  - Internal hemorrhoids    Procedure:    Colonoscopy     Surgeon:  Kiran Maria M.D.    Anesthesia:  MAC     Technique:  After informed consent, the patient was placed in the left lateral recumbent position.  Digital rectal examination revealed no palpable intraluminal abnormalities.  An Olympus variable stiffness 190 series HD colonoscope was inserted into the rectum and advanced under direct vision by following the lumen to the cecum.  The colon was examined upon withdrawal in the left lateral position.  The procedure was well tolerated without immediate complication.    Findings:  The preparation of the colon was good.  The terminal ileum was examined for 4 cm and visually normal.  The ileocecal valve was well preserved. The visualized colonic mucosa from the cecum to the anal verge was normal with an intact vascular pattern.    Colon polyps x 2 removed from the left side of the colon, both polyps were sessile approximately 3 to 4 mm in size and cold snare removed.  Both sites were inspected and found to be free of bleeding specimens retrieved and sent for analysis.    Small internal hemorrhoids noted on retroflexed view.    Estimated blood loss-insignificant  Specimens-see above    Impression:  - Colon polyps  - Diverticulosis  - Internal hemorrhoids    Recommendations:  - Post polypectomy instructions given  - Repeat colonoscopy in 5- 10 years  - Symptomatic treatment of hemorrhoids          Kiran Maria MD  6/27/2025  10:39 AM

## 2025-06-27 NOTE — ANESTHESIA PREPROCEDURE EVALUATION
Anesthesia PreOp Note    HPI:     Jim Vizcarra is a 65 year old male who presents for preoperative consultation requested by: Kiran Maria MD    Date of Surgery: 6/27/2025    Procedure(s):  COLONOSCOPY  Indication: Screen for colon cancer    Relevant Problems   No relevant active problems       NPO:  Last Liquid Consumption Date: 06/27/25  Last Liquid Consumption Time: 0530  Last Solid Consumption Date: 06/25/25  Last Solid Consumption Time: 2000  Last Liquid Consumption Date: 06/27/25          History Review:  Patient Active Problem List    Diagnosis Date Noted    Hypercholesterolemia 07/14/2021    Left bundle branch block 03/08/2017    Dilated cardiomyopathy (HCC) 03/08/2017    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             Vital Signs:  Body mass index is 28.7 kg/m².   height is 1.778 m (5' 10\") and weight is 90.7 kg (200 lb). His blood pressure is 113/71 and his pulse is 55. His respiration is 12 and oxygen saturation is 96%.   Vitals:    06/25/25 0903 06/27/25 0904 06/27/25 0917   BP:   113/71   Pulse:   55   Resp:   12   SpO2:   96%   Weight: 90.7 kg (200 lb) 90.7 kg (200 lb)    Height: 1.778 m (5' 10\") 1.778 m (5' 10\")         Anesthesia Evaluation     Patient summary reviewed and Nursing notes reviewed    No history of anesthetic complications   Airway   Mallampati: III  TM distance: >3 FB  Neck ROM: full  Dental      Pulmonary - normal exam    breath sounds clear to auscultation  Cardiovascular   Exercise tolerance: good  (+) murmur    Rhythm: regular  Rate: normal    Neuro/Psych    (+)  neuromuscular disease,        GI/Hepatic/Renal      Endo/Other    Abdominal      Other findings: Jaw pops.  Large crack on  #9            Anesthesia Plan:   ASA:  3  Plan:   MAC  Plan Comments: LBBB, dilated cardiomyopathy.  Old echo reviewed.  II/VI ELOISE.  Discussed increased risks with Boyd and his wife.  Wishes to proceed.  Informed Consent Plan and Risks Discussed With:  Patient and spouse      I have informed Jim Vizcarra and/or legal guardian or family member of the nature of the anesthetic plan, benefits, risks including possible dental damage if relevant, major complications, and any alternative forms of anesthetic management.   All of the patient's questions were answered to the best of my ability. The patient desires the anesthetic management as planned.  Davin Ornelas MD  6/27/2025 9:24 AM  Present on Admission:  **None**           [1]   Past Medical History:   Allergy    Appendicitis    Dilated cardiomyopathy (HCC)    H/O hernia repair    Poison ivy dermatitis    S/P tonsillectomy   [2]   Past Surgical History:  Procedure Laterality Date    Appendectomy      Colonoscopy      Vasectomy  01/01/2001   [3]   Medications Prior to Admission   Medication Sig Dispense Refill Last Dose/Taking    Na Sulfate-K Sulfate-Mg Sulf (SUPREP BOWEL PREP KIT) 17.5-3.13-1.6 GM/177ML Oral Solution Take as directed 1 each 0 Taking    albuterol (PROAIR HFA) 108 (90 Base) MCG/ACT Inhalation Aero Soln Inhale 2 puffs into the lungs every 4 (four) hours as needed for Wheezing. 1 each 6 Taking As Needed    rosuvastatin (CRESTOR) 20 MG Oral Tab Take 1 tablet (20 mg total) by mouth daily. 90 tablet 1 6/26/2025    carvedilol 25 MG Oral Tab Take 1 tablet (25 mg total) by mouth 2 (two) times daily. 180 tablet 3 6/26/2025    Losartan Potassium 25 MG Oral Tab Take 1 tablet (25 mg total) by mouth daily. 90 tablet 3 6/26/2025    Spacer/Aero-Holding Chambers (AEROCHAMBER MV) Does not apply Misc Use with inhaler 1 each 0     fexofenadine 180 MG Oral Tab Take 1 tablet (180 mg total) by mouth as needed.   6/26/2025   [4]   Current Facility-Administered Medications  Ordered in Epic   Medication Dose Route Frequency Provider Last Rate Last Admin    lactated ringers infusion   Intravenous Continuous Kiran Maria MD         No current [facility]-ordered outpatient medications on file.   [5]   Allergies  Allergen Reactions    Sulfa Antibiotics      Other reaction(s): Unknown   [6]   Family History  Problem Relation Age of Onset    Obesity Father     Pulmonary Disease Father         COPD    Lung Disorder Mother         EMPHYSEMA, CAUSE OF DEATH    Depression Sister    [7]   Social History  Socioeconomic History    Marital status:    Tobacco Use    Smoking status: Former     Current packs/day: 0.00     Types: Cigarettes     Quit date:      Years since quittin.5    Smokeless tobacco: Never   Vaping Use    Vaping status: Never Used   Substance and Sexual Activity    Alcohol use: Yes     Comment: BEER&WINE 2 DRINKS DAILY    Drug use: No    Sexual activity: Not Currently     Partners: Female   Other Topics Concern    Caffeine Concern Yes     Comment: COFFEE 3 CUPS/DAY

## 2025-06-27 NOTE — H&P
History & Physical Examination    Patient Name: Jim Vizcarra  MRN: [id number]  [facility]: [id number]  YOB: 1960    Diagnosis:   Colon screening      Prescriptions Prior to Admission[1]  Current Hospital Medications[2]    Allergies: Allergies[3]    Past Medical History[4]  Past Surgical History[5]  Family History[6]  Social History     Tobacco Use    Smoking status: Former     Current packs/day: 0.00     Types: Cigarettes     Quit date:      Years since quittin.5    Smokeless tobacco: Never   Substance Use Topics    Alcohol use: Yes     Comment: BEER&WINE 2 DRINKS DAILY       SYSTEM Check if Review is Normal Check if Physical Exam is Normal If not normal, please explain:   HEENT [x ] [ x]    NECK & BACK [x ] [x ]    HEART [x ] [ x]    LUNGS [x ] [ x]    ABDOMEN [x ] [x ]    UROGENITAL [ ] [ ]    EXTREMITIES [x ] [x ]    OTHER        [ x ] I have discussed the risks and benefits and alternatives with the patient/family.  They understand and agree to proceed with plan of care.  [ x ] I have reviewed the History and Physical done within the last 30 days.  Any changes noted above.    Kiran Maria MD  2025  9:57 AM         [1]   Medications Prior to Admission   Medication Sig Dispense Refill Last Dose/Taking    Na Sulfate-K Sulfate-Mg Sulf (SUPREP BOWEL PREP KIT) 17.5-3.13-1.6 GM/177ML Oral Solution Take as directed 1 each 0 Taking    albuterol (PROAIR HFA) 108 (90 Base) MCG/ACT Inhalation Aero Soln Inhale 2 puffs into the lungs every 4 (four) hours as needed for Wheezing. 1 each 6 Taking As Needed    rosuvastatin (CRESTOR) 20 MG Oral Tab Take 1 tablet (20 mg total) by mouth daily. 90 tablet 1 2025    carvedilol 25 MG Oral Tab Take 1 tablet (25 mg total) by mouth 2 (two) times daily. 180 tablet 3 2025    Losartan Potassium 25 MG Oral Tab Take 1 tablet (25 mg total) by mouth daily. 90 tablet 3 2025    Spacer/Aero-Holding Chambers (AEROCHAMBER MV) Does not apply Misc Use with  inhaler 1 each 0     fexofenadine 180 MG Oral Tab Take 1 tablet (180 mg total) by mouth as needed.   6/26/2025   [2]   Current Facility-Administered Medications   Medication Dose Route Frequency    lactated ringers infusion   Intravenous Continuous   [3]   Allergies  Allergen Reactions    Sulfa Antibiotics      Other reaction(s): Unknown   [4]   Past Medical History:   Allergy    Appendicitis    Dilated cardiomyopathy (HCC)    H/O hernia repair    Poison ivy dermatitis    S/P tonsillectomy   [5]   Past Surgical History:  Procedure Laterality Date    Appendectomy      Colonoscopy      Vasectomy  01/01/2001   [6]   Family History  Problem Relation Age of Onset    Obesity Father     Pulmonary Disease Father         COPD    Lung Disorder Mother         EMPHYSEMA, CAUSE OF DEATH    Depression Sister

## 2025-06-27 NOTE — DISCHARGE INSTRUCTIONS
Home Care Instructions for Colonoscopy with Sedation    Diet:  - Resume your regular diet as tolerated unless otherwise instructed.  - Start with light meals to minimize bloating.  - Do not drink alcohol today.    Medication:  - If you have questions about resuming your normal medications, please contact your Primary Care Physician.    Activities:  - Take it easy today. Do not return to work today.  - Do not drive today.  - Do not operate any machinery today (including kitchen equipment).    Colonoscopy:  - You may notice some rectal \"spotting\" (a little blood on the toilet tissue) for a day or two after the exam. This is normal.  - If you experience any rectal bleeding (not spotting), persistent tenderness or sharp severe abdominal pains, oral temperature over 100 degrees Fahrenheit, light-headedness or dizziness, or any other problems, contact your doctor.      **If unable to reach your doctor, please go to the [facility] Emergency Room**    - Your referring physician will receive a full report of your examination.  - If you do not hear from your doctor's office within two weeks of your biopsy, please call them for your results.    You may be able to see your laboratory results in [facility] app between 4 and 7 business days.  In some cases, your physician may not have viewed the results before they are released to [facility] app.  If you have questions regarding your results contact the physician who ordered the test/exam by phone or via [facility] app by choosing \"Ask a Medical Question.\"

## 2025-07-07 ENCOUNTER — TELEPHONE (OUTPATIENT)
Facility: CLINIC | Age: 65
End: 2025-07-07

## 2025-07-07 NOTE — TELEPHONE ENCOUNTER
I mailed out colonoscopy results letter to patient.  Updated health maintenance  Entered into 10 yr CLN recall  Recall colon in 10 years per. Colon done 6/27/2025        Kiran Maria MD  [facility]  I wanted to get back to you with your colonoscopy results.  You had 2 colon polyps removed which were benign.  I would advise a repeat colonoscopy in 10 years to make sure no new polyps are forming.        You also have internal hemorrhoids and diverticulosis.  Please stay on a high fiber diet and call with any questions.

## 2025-07-25 ENCOUNTER — OFFICE VISIT (OUTPATIENT)
Dept: INTERNAL MEDICINE CLINIC | Facility: CLINIC | Age: 65
End: 2025-07-25

## 2025-07-25 VITALS
RESPIRATION RATE: 18 BRPM | SYSTOLIC BLOOD PRESSURE: 100 MMHG | DIASTOLIC BLOOD PRESSURE: 64 MMHG | HEART RATE: 64 BPM | WEIGHT: 195 LBS | HEIGHT: 70 IN | BODY MASS INDEX: 27.92 KG/M2 | TEMPERATURE: 98 F | OXYGEN SATURATION: 98 %

## 2025-07-25 DIAGNOSIS — I42.0 DILATED CARDIOMYOPATHY (HCC): ICD-10-CM

## 2025-07-25 DIAGNOSIS — Z00.00 ROUTINE PHYSICAL EXAMINATION: Primary | ICD-10-CM

## 2025-07-25 DIAGNOSIS — E78.00 HYPERCHOLESTEROLEMIA: ICD-10-CM

## 2025-07-25 DIAGNOSIS — G47.33 OSA (OBSTRUCTIVE SLEEP APNEA): ICD-10-CM

## 2025-07-25 DIAGNOSIS — M54.2 NECK PAIN: ICD-10-CM

## 2025-07-25 PROCEDURE — 99396 PREV VISIT EST AGE 40-64: CPT | Performed by: INTERNAL MEDICINE

## 2025-07-25 NOTE — PROGRESS NOTES
HPI:    Patient ID: Jim Vizcarra is a 65 year old male.    HPI    Patient returns to the office today requesting general physical exam as well as to discuss chronic medical issues as listed on the active problem list below.  Patient last seen in the office by me in 2022.  During the last visit, the following changes were made: None.  Since the last visit, the patient has seen the following doctors: Other providers here in the office.  Also cardiology for dilated cardiomyopathy and ophthalmology. Colonoscopy done recently showed hyperplastic polyps; 10 yr f/u.    Today, the patient offers the following complaints: Ongoing floaters in the left eye; he saw Optho; they are monitoring it.   2 weeks ago, he felt neck pain. In the past, he would crack his neck with relief. It did not help this time. Pain would come and go with no clear pattern. It is still a little tight. No pain for about 5 days now.   Patient describes diet as good and healthy. Lot of fiber.   For exercise, the patient gardens, walks. 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   Tobacco and alcohol use reviewed. He stopped drinking in January. No tobacco.   Current medications reviewed.   Health maintenance issues reviewed.    Wt Readings from Last 6 Encounters:   07/25/25 195 lb (88.5 kg)   06/27/25 200 lb (90.7 kg)   11/12/24 205 lb 12.8 oz (93.4 kg)   08/28/23 201 lb (91.2 kg)   02/11/22 197 lb (89.4 kg)   01/17/22 200 lb (90.7 kg)       Problem List[1]     HISTORY:  Past Medical History[2]   Past Surgical History[3]   Family History[4]   Short Social Hx on File[5]       Review of Systems        Current Medications[6]  Allergies:Allergies[7]     PHYSICAL EXAM:   /64 (BP Location: Left arm, Patient Position: Sitting, Cuff Size: large)   Pulse 64   Temp 97.8 °F (36.6 °C) (Other)   Resp 18   Ht 5' 10\" (1.778 m)   Wt 195 lb (88.5 kg)   SpO2 98%   BMI 27.98 kg/m²      Physical Exam  Constitutional:       Appearance: Normal appearance.  He is well-developed.   HENT:      Right Ear: Tympanic membrane and ear canal normal.      Left Ear: Tympanic membrane and ear canal normal.      Nose: Nose normal.      Mouth/Throat:      Pharynx: No oropharyngeal exudate or posterior oropharyngeal erythema.   Eyes:      Conjunctiva/sclera: Conjunctivae normal.      Pupils: Pupils are equal, round, and reactive to light.   Neck:      Thyroid: No thyromegaly.      Vascular: No carotid bruit.   Cardiovascular:      Rate and Rhythm: Normal rate and regular rhythm.      Pulses: Normal pulses.      Heart sounds: Normal heart sounds. No murmur heard.  Pulmonary:      Effort: Pulmonary effort is normal.      Breath sounds: Normal breath sounds. No wheezing or rales.   Abdominal:      General: Bowel sounds are normal.      Palpations: Abdomen is soft. There is no mass.      Tenderness: There is no abdominal tenderness.      Hernia: There is no hernia in the left inguinal area.   Genitourinary:     Penis: Normal and circumcised.       Testes: Normal.   Musculoskeletal:      Right lower leg: No edema.      Left lower leg: No edema.   Lymphadenopathy:      Cervical: No cervical adenopathy.   Skin:     General: Skin is warm and dry.      Findings: No rash.   Neurological:      General: No focal deficit present.      Mental Status: He is alert.      Cranial Nerves: No cranial nerve deficit.      Coordination: Coordination normal.   Psychiatric:         Mood and Affect: Mood normal.         Behavior: Behavior normal.         Thought Content: Thought content normal.         Judgment: Judgment normal.                 ASSESSMENT/PLAN:   1. Routine physical examination  Physical exam is unremarkable.  Active issues as below.  Health maintenance issues reviewed.  Overall the patient is doing well.  Encouraged healthy diet and regular exercise.  Would like to see him get a little more active physically.  We will check fasting blood work and contact patient with results.  If things are  going well and he is following up with the specialist, he can return here in 6 months.  We discussed vaccinations at length.  He would be due for tetanus booster, pneumonia, shingles.  Does not want to get anything today.  He will call back with his decision.  - PSA Total, Screen; Future  - CBC With Differential With Platelet; Future  - Comp Metabolic Panel (14); Future  - Lipid Panel; Future  - TSH W Reflex To Free T4; Future    2. Dilated cardiomyopathy (HCC)  Stable.  No evidence of volume overload.  Continue current treatment and follow-up with cardiology.  Blood pressure controlled.    3. Hypercholesterolemia  Check cholesterol levels.  Adjust dose of rosuvastatin if needed.    4. SORAIDA (obstructive sleep apnea)  Mild.  He does not use any CPAP.    5. Neck pain  Recent onset of neck pain and stiffness.  No evidence of radicular symptoms or neurologic issues.  Slightly persistent after 2 weeks.  Check x-ray.  Consider physical therapy.  Patient will call back if he wants to proceed with that.  - XR CERVICAL SPINE (2-3 VIEWS) (CPT=72040); Future        Meds This Visit:  Requested Prescriptions      No prescriptions requested or ordered in this encounter       Imaging & Referrals:  None         Buck Stafford MD        [1]   Patient Active Problem List  Diagnosis    Left bundle branch block    Dilated cardiomyopathy (HCC)    Hypercholesterolemia    Anemia    Brachial neuritis    Umbilical hernia    Colon cancer screening   [2]   Past Medical History:   Allergy    Appendicitis    Dilated cardiomyopathy (HCC)    H/O hernia repair    Poison ivy dermatitis    S/P tonsillectomy   [3]   Past Surgical History:  Procedure Laterality Date    Appendectomy      Colonoscopy      Colonoscopy N/A 6/27/2025    Procedure: COLONOSCOPY;  Surgeon: Kiran Maria MD;  Location: [facility] ENDO    Vasectomy  01/01/2001   [4]   Family History  Problem Relation Age of Onset    Obesity Father     Pulmonary Disease Father         COPD    Lung  Disorder Mother         EMPHYSEMA, CAUSE OF DEATH    Depression Sister    [5]   Social History  Socioeconomic History    Marital status:    Tobacco Use    Smoking status: Former     Current packs/day: 0.00     Types: Cigarettes     Quit date:      Years since quittin.5    Smokeless tobacco: Never   Vaping Use    Vaping status: Never Used   Substance and Sexual Activity    Alcohol use: Yes     Comment: BEER&WINE 2 DRINKS DAILY    Drug use: No    Sexual activity: Not Currently     Partners: Female   Other Topics Concern    Caffeine Concern Yes     Comment: COFFEE 3 CUPS/DAY     Social Drivers of Health     Food Insecurity: No Food Insecurity (2025)    NCSS - Food Insecurity     Worried About Running Out of Food in the Last Year: No     Ran Out of Food in the Last Year: No   Transportation Needs: No Transportation Needs (2025)    NCSS - Transportation     Lack of Transportation: No   Housing Stability: Not At Risk (2025)    NCSS - Housing/Utilities     Has Housing: Yes     Worried About Losing Housing: No     Unable to Get Utilities: No   [6]   Current Outpatient Medications   Medication Sig Dispense Refill    albuterol (PROAIR HFA) 108 (90 Base) MCG/ACT Inhalation Aero Soln Inhale 2 puffs into the lungs every 4 (four) hours as needed for Wheezing. 1 each 6    rosuvastatin (CRESTOR) 20 MG Oral Tab Take 1 tablet (20 mg total) by mouth daily. 90 tablet 1    carvedilol 25 MG Oral Tab Take 1 tablet (25 mg total) by mouth 2 (two) times daily. 180 tablet 3    Losartan Potassium 25 MG Oral Tab Take 1 tablet (25 mg total) by mouth daily. 90 tablet 3    Spacer/Aero-Holding Chambers (AEROCHAMBER MV) Does not apply Misc Use with inhaler 1 each 0    fexofenadine 180 MG Oral Tab Take 1 tablet (180 mg total) by mouth as needed.     [7]   Allergies  Allergen Reactions    Sulfa Antibiotics      Other reaction(s): Unknown

## 2025-08-04 ENCOUNTER — LAB ENCOUNTER (OUTPATIENT)
Dept: LAB | Age: 65
End: 2025-08-04
Attending: INTERNAL MEDICINE

## 2025-08-04 DIAGNOSIS — Z00.00 ROUTINE PHYSICAL EXAMINATION: ICD-10-CM

## 2025-08-04 LAB
ALBUMIN SERPL-MCNC: 4.6 G/DL (ref 3.2–4.8)
ALBUMIN/GLOB SERPL: 2 (ref 1–2)
ALP LIVER SERPL-CCNC: 58 U/L (ref 45–117)
ALT SERPL-CCNC: 25 U/L (ref 10–49)
ANION GAP SERPL CALC-SCNC: 7 MMOL/L (ref 0–18)
AST SERPL-CCNC: 23 U/L (ref ?–34)
BILIRUB SERPL-MCNC: 0.8 MG/DL (ref 0.2–1.1)
BUN BLD-MCNC: 12 MG/DL (ref 9–23)
BUN/CREAT SERPL: 12.5 (ref 10–20)
CALCIUM BLD-MCNC: 9.1 MG/DL (ref 8.7–10.4)
CHLORIDE SERPL-SCNC: 106 MMOL/L (ref 98–112)
CHOLEST SERPL-MCNC: 120 MG/DL (ref ?–200)
CO2 SERPL-SCNC: 27 MMOL/L (ref 21–32)
COMPLEXED PSA SERPL-MCNC: 1.08 NG/ML (ref ?–4)
CREAT BLD-MCNC: 0.96 MG/DL (ref 0.7–1.3)
EGFRCR SERPLBLD CKD-EPI 2021: 89 ML/MIN/1.73M2 (ref 60–?)
FASTING PATIENT LIPID ANSWER: YES
FASTING STATUS PATIENT QL REPORTED: YES
GLOBULIN PLAS-MCNC: 2.3 G/DL (ref 2–3.5)
GLUCOSE BLD-MCNC: 91 MG/DL (ref 70–99)
HDLC SERPL-MCNC: 32 MG/DL (ref 40–59)
LDLC SERPL CALC-MCNC: 71 MG/DL (ref ?–100)
NONHDLC SERPL-MCNC: 88 MG/DL (ref ?–130)
OSMOLALITY SERPL CALC.SUM OF ELEC: 289 MOSM/KG (ref 275–295)
POTASSIUM SERPL-SCNC: 4.5 MMOL/L (ref 3.5–5.1)
PROT SERPL-MCNC: 6.9 G/DL (ref 5.7–8.2)
SODIUM SERPL-SCNC: 140 MMOL/L (ref 136–145)
T3FREE SERPL-MCNC: 3.43 PG/ML (ref 2.4–4.2)
T4 FREE SERPL-MCNC: 1.4 NG/DL (ref 0.8–1.7)
TRIGL SERPL-MCNC: 89 MG/DL (ref 30–149)
TSI SER-ACNC: 0.26 UIU/ML (ref 0.55–4.78)
VLDLC SERPL CALC-MCNC: 14 MG/DL (ref 0–30)

## 2025-08-04 PROCEDURE — 80061 LIPID PANEL: CPT

## 2025-08-04 PROCEDURE — 80053 COMPREHEN METABOLIC PANEL: CPT

## 2025-08-04 PROCEDURE — 84439 ASSAY OF FREE THYROXINE: CPT

## 2025-08-04 PROCEDURE — 85060 BLOOD SMEAR INTERPRETATION: CPT

## 2025-08-04 PROCEDURE — 36415 COLL VENOUS BLD VENIPUNCTURE: CPT

## 2025-08-04 PROCEDURE — 85027 COMPLETE CBC AUTOMATED: CPT

## 2025-08-04 PROCEDURE — 85025 COMPLETE CBC W/AUTO DIFF WBC: CPT

## 2025-08-04 PROCEDURE — 84481 FREE ASSAY (FT-3): CPT

## 2025-08-04 PROCEDURE — 85007 BL SMEAR W/DIFF WBC COUNT: CPT

## 2025-08-04 PROCEDURE — 84443 ASSAY THYROID STIM HORMONE: CPT

## 2025-08-05 ENCOUNTER — RESULTS FOLLOW-UP (OUTPATIENT)
Facility: CLINIC | Age: 65
End: 2025-08-05

## 2025-08-05 LAB
BASOPHILS # BLD: 0.08 X10(3) UL (ref 0–0.2)
BASOPHILS NFR BLD: 1 %
DEPRECATED RDW RBC AUTO: 40.5 FL (ref 35.1–46.3)
EOSINOPHIL # BLD: 0.15 X10(3) UL (ref 0–0.7)
EOSINOPHIL NFR BLD: 2 %
ERYTHROCYTE [DISTWIDTH] IN BLOOD BY AUTOMATED COUNT: 12.4 % (ref 11–15)
HCT VFR BLD AUTO: 37.5 % (ref 39–53)
HGB BLD-MCNC: 12.3 G/DL (ref 13–17.5)
LYMPHOCYTES NFR BLD: 5.47 X10(3) UL (ref 1–4)
LYMPHOCYTES NFR BLD: 68 %
MCH RBC QN AUTO: 29.1 PG (ref 26–34)
MCHC RBC AUTO-ENTMCNC: 32.8 G/DL (ref 31–37)
MCV RBC AUTO: 88.9 FL (ref 80–100)
MONOCYTES # BLD: 0.61 X10(3) UL (ref 0.1–1)
MONOCYTES NFR BLD: 8 %
MORPHOLOGY: NORMAL
NEUTROPHILS # BLD AUTO: 1.67 X10 (3) UL (ref 1.5–7.7)
NEUTROPHILS NFR BLD: 17 %
NEUTS HYPERSEG # BLD: 1.29 X10(3) UL (ref 1.5–7.7)
PLATELET # BLD AUTO: 165 10(3)UL (ref 150–450)
PLATELET MORPHOLOGY: NORMAL
RBC # BLD AUTO: 4.22 X10(6)UL (ref 4.3–5.7)
TOTAL CELLS COUNTED BLD: 100
VARIANT LYMPHS NFR BLD MANUAL: 4 % (ref ?–4)
WBC # BLD AUTO: 7.6 X10(3) UL (ref 4–11)

## (undated) DEVICE — KIT CLEAN ENDOKIT 1.1OZ GOWNX2

## (undated) DEVICE — Device

## (undated) DEVICE — 60 ML SYRINGE REGULAR TIP: Brand: MONOJECT

## (undated) DEVICE — GIJAW SINGLE-USE BIOPSY FORCEPS WITH NEEDLE: Brand: GIJAW

## (undated) DEVICE — V2 SPECIMEN COLLECTION MANIFOLD KIT: Brand: NEPTUNE

## (undated) DEVICE — MEDI-VAC NON-CONDUCTIVE SUCTION TUBING 6MM X 1.8M (6FT.) L: Brand: CARDINAL HEALTH

## (undated) DEVICE — KIT ENDO ORCAPOD 160/180/190

## (undated) NOTE — LETTER
July 19, 2021     [facility] [address]      Dear Elie Roman:    Below are the results from your recent visit:    Resulted Orders   COMP METABOLIC PANEL (14)   Result Value Ref Range    Glucose 94 70 - 99 mg/dL    Sodium 14 g/dL    RDW-SD 41.0 35.1 - 46.3 fL    RDW 12.3 11.0 - 15.0 %    .0 150.0 - 450.0 10(3)uL    Neutrophil Absolute Prelim 2.04 1.50 - 7.70 x10 (3) uL    Neutrophil Absolute 2.04 1.50 - 7.70 x10(3) uL    Lymphocyte Absolute 2.57 1.00 - 4.00 x10(3) uL

## (undated) NOTE — LETTER
[facility], [place]  Authorization for Invasive Procedures  1.  I hereby authorize Dr. Elvira Tabares , my physician and whomever may be designated as the doctor's assistant, to perform the following operation and/or procedure:  CARDIAC 5. I consent to the photographing of the operations or procedures to be performed for the purposes of advancing medicine, science, and/or education, provided my identity is not revealed.  If the procedure has been videotaped, the physician/surgeon will obta __________ Time: ___________    Statement of Physician  My signature below affirms that prior to the time of the procedure, I have explained to the patient and/or his legal representative, the risks and benefits involved in the proposed treatment and any r

## (undated) NOTE — LETTER
[facility]  Administration of Anesthesia  I, Jim Vizcarra agree to be cared for by a physician anesthesiologist alone and/or with a nurse anesthetist, who is specially trained to monitor me and give me medicine to put me to sleep or keep me comfortable during my procedure    I understand that my anesthesiologist and/or anesthetist is not an employee or agent of [facility] or [employer]. He or she works for [facility]    As the patient asking for anesthesia services, I agree to:  Allow the anesthesiologist (anesthesia doctor) to give me medicine and do additional procedures as necessary. Some examples are: Starting or using an “IV” to give me medicine, fluids or blood during my procedure, and having a breathing tube placed to help me breathe when I’m asleep (intubation). In the event that my heart stops working properly, I understand that my anesthesiologist will make every effort to sustain my life, unless otherwise directed by [facility] Do Not Resuscitate documents.  Tell my anesthesia doctor before my procedure:  If I am pregnant.  The last time that I ate or drank.  iii. All of the medicines I take (including prescriptions, herbal supplements, and pills I can buy without a prescription (including street drugs/illegal medications). Failure to inform my anesthesiologist about these medicines may increase my risk of anesthetic complications.  iv.If I am allergic to anything or have had a reaction to anesthesia before.  I understand how the anesthesia medicine will help me (benefits).  I understand that with any type of anesthesia medicine there are risks:  The most common risks are: nausea, vomiting, sore throat, muscle soreness, damage to my eyes, mouth, or teeth (from breathing tube placement).  Rare risks include: remembering what happened during my procedure, allergic reactions to medications, injury to my airway, heart, lungs, vision, nerves, or  muscles and in extremely rare instances death.  My doctor has explained to me other choices available to me for my care (alternatives).  Pregnant Patients (“epidural”):  I understand that the risks of having an epidural (medicine given into my back to help control pain during labor), include itching, low blood pressure, difficulty urinating, headache or slowing of the baby’s heart. Very rare risks include infection, bleeding, seizure, irregular heart rhythms and nerve injury.  Regional Anesthesia (“spinal”, “epidural”, & “nerve blocks”):  I understand that rare but potential complications include headache, bleeding, infection, seizure, irregular heart rhythms, and nerve injury.    _____________________________________________________________________________  Patient (or Representative) Signature/Relationship to Patient  Date   Time    _____________________________________________________________________________   Name (if used)    Language/Organization   Time    _____________________________________________________________________________  Nurse Anesthetist Signature     Date   Time  _____________________________________________________________________________  Anesthesiologist Signature     Date   Time  I have discussed the procedure and information above with the patient (or patient’s representative) and answered their questions. The patient or their representative has agreed to have anesthesia services.    _____________________________________________________________________________  Witness        Date   Time  I have verified that the signature is that of the patient or patient’s representative, and that it was signed before the procedure  Patient Name: Jim Vizcarra     : 3/28/1960                 Printed: 2025 at 11:58 AM    Medical Record #: [id number]                                            Page 1 of 1  ----------ANESTHESIA CONSENT----------

## (undated) NOTE — LETTER
September 16, 2019     [facility] [address]      Dear Campbell Jackson:    Below are the results from your recent visit:    Resulted Orders   FREE T3 (TRIIODOTHYRONINE)   Result Value Ref Range    T3 Free 2.70 2.40 - 4.20 pg/mL

## (undated) NOTE — LETTER
7/7/2025                      Jim Vizcarra               [address]    [place] [address]          Dear Jim,    I wanted to get back to you with your colonoscopy results.  You had 2 colon polyps removed which were benign.  I would advise a repeat colonoscopy in 10 years to make sure no new polyps are forming.        You also have internal hemorrhoids and diverticulosis.  Please stay on a high fiber diet and call with any questions.         Kiran Maria MD  [address] [place] [address]  [facility] [address]  Ph: [phone]  Fax: [phone]

## (undated) NOTE — LETTER
December 8, 2019     [facility] [address]      Dear Sandra Infante:    Below are the results from your recent visit:    [address] [place] [address] Jennifer Treviño    Transcription          [place] [facility].  There were 5 central apneic events for an apnea index of 0.7 events per hour. There were 36 hypopneic events for a hypopnea index of 5.2 events per hour.   The combined apnea plus hypopnea index is 5.9 events per hour, the REM apnea plus hypopnea ind

## (undated) NOTE — LETTER
10/17/2024    Jim Vizcarra        [address]        [place] [address]            Dear Jim Vizcarra,      Our records indicate that you are due for an appointment for a Colonoscopy with Nikko Baptiste MD. Our doctors are booking out about 3-6 months in advance for procedures.    Please call our office to schedule a phone screening appointment to plan for the procedure(s).   Your medical well-being is important to us.    If your insurance requires a referral, please call your primary care office to request one.      Thank you,      The Physicians and Staff at [facility]

## (undated) NOTE — MR AVS SNAPSHOT
[address] [address]  [phone]               Thank you for choosing us for your health care visit with Tavon Sanchez MD.  We are glad to serve you and happy to provide you with this summar Scheduling Instructions     Tuesday February 07, 2017     Cardiology:  CARD ECHO 2D DOPPLER CONTRAST (CPT=93306)    Instructions:   To schedule a test at any [facility] at [phone], Monday through Fr Educational Information     Healthy Diet and Regular Exercise  [employer] [facility] [address] for making healthy food choices  -   Enjoy your food, but eat less. Fully enjoy your food when eating. Don’t eat while distracted and slow down.    Avoid

## (undated) NOTE — MR AVS SNAPSHOT
[address]  [address]  [address] [facility]  [phone]               Thank you for choosing us for your health care visit with Que Urias MD.  We are glad to serve you and happy to provide you with this summary Use with inhaler           Albuterol Sulfate  (90 Base) MCG/ACT Aers   Inhale 2 puffs into the lungs every 4 (four) hours as needed for Wheezing. What changed:  Another medication with the same name was removed.  Continue taking this medication, an

## (undated) NOTE — LETTER
[facility]  [address], [place], [place]    Authorization for Surgical Operation and Procedure                               I hereby authorize Kiran Maria MD, my physician and his/her assistants (if applicable), which may include medical students, residents, and/or fellows, to perform the following surgical operation/ procedure and administer such anesthesia as may be determined necessary by my physician: Operation/Procedure name (s) COLONOSCOPY on Jim Vizcarra   2.   I recognize that during the surgical operation/procedure, unforeseen conditions may necessitate additional or different procedures than those listed above.  I, therefore, further authorize and request that the above-named surgeon, assistants, or designees perform such procedures as are, in their judgment, necessary and desirable.    3.   My surgeon/physician has discussed prior to my surgery the potential benefits, risks and side effects of this procedure; the likelihood of achieving goals; and potential problems that might occur during recuperation.  They also discussed reasonable alternatives to the procedure, including risks, benefits, and side effects related to the alternatives and risks related to not receiving this procedure.  I have had all my questions answered and I acknowledge that no guarantee has been made as to the result that may be obtained.    4.   Should the need arise during my operation/procedure, which includes change of level of care prior to discharge, I also consent to the administration of blood and/or blood products.  Further, I understand that despite careful testing and screening of blood or blood products by collecting agencies, I may still be subject to ill effects as a result of receiving a blood transfusion and/or blood products.  The following are some, but not all, of the potential risks that can occur: fever and allergic reactions, hemolytic reactions, transmission of diseases such as  Hepatitis, AIDS and Cytomegalovirus (CMV) and fluid overload.  In the event that I wish to have an autologous transfusion of my own blood, or a directed donor transfusion, I will discuss this with my physician.  Check only if Refusing Blood or Blood Products  I understand refusal of blood or blood products as deemed necessary by my physician may have serious consequences to my condition to include possible death. I hereby assume responsibility for my refusal and release the hospital, its personnel, and my physicians from any responsibility for the consequences of my refusal.    o  Refuse   5.   I authorize the use of any specimen, organs, tissues, body parts or foreign objects that may be removed from my body during the operation/procedure for diagnosis, research or teaching purposes and their subsequent disposal by hospital authorities.  I also authorize the release of specimen test results and/or written reports to my treating physician on the hospital medical staff or other referring or consulting physicians involved in my care, at the discretion of the Pathologist or my treating physician.    6.   I consent to the photographing or videotaping of the operations or procedures to be performed, including appropriate portions of my body for medical, scientific, or educational purposes, provided my identity is not revealed by the pictures or by descriptive texts accompanying them.  If the procedure has been photographed/videotaped, the surgeon will obtain the original picture, image, videotape or CD.  The hospital will not be responsible for storage, release or maintenance of the picture, image, tape or CD.    7.   I consent to the presence of a  or observers in the operating room as deemed necessary by my physician or their designees.    8.   I recognize that in the event my procedure results in extended X-Ray/fluoroscopy time, I may develop a skin reaction.    9. If I have a Do Not Attempt  Resuscitation (DNAR) order in place, that status will be suspended while in the operating room, procedural suite, and during the recovery period unless otherwise explicitly stated by me (or a person authorized to consent on my behalf). The surgeon or my attending physician will determine when the applicable recovery period ends for purposes of reinstating the DNAR order.  10. Patients having a sterilization procedure: I understand that if the procedure is successful the results will be permanent and it will therefore be impossible for me to inseminate, conceive, or bear children.  I also understand that the procedure is intended to result in sterility, although the result has not been guaranteed.   11. I acknowledge that my physician has explained sedation/analgesia administration to me including the risk and benefits I consent to the administration of sedation/analgesia as may be necessary or desirable in the judgment of my physician.    I CERTIFY THAT I HAVE READ AND FULLY UNDERSTAND THE ABOVE CONSENT TO OPERATION and/or OTHER PROCEDURE.     ____________________________________  _________________________________        ______________________________  Signature of Patient    Signature of Responsible Person                Printed Name of Responsible Person                                      ____________________________________  _____________________________                ________________________________  Signature of Witness        Date  Time         Relationship to Patient    STATEMENT OF PHYSICIAN My signature below affirms that prior to the time of the procedure; I have explained to the patient and/or his/her legal representative, the risks and benefits involved in the proposed treatment and any reasonable alternative to the proposed treatment. I have also explained the risks and benefits involved in refusal of the proposed treatment and alternatives to the proposed treatment and have answered the patient's  questions. If I have a significant financial interest in a co-management agreement or a significant financial interest in any product or implant, or other significant relationship used in this procedure/surgery, I have disclosed this and had a discussion with my patient.     _____________________________________________________              _____________________________  (Signature of Physician)                                                                                         (Date)                                   (Time)  Patient Name: Jim Vizcarra      : 3/28/1960      Printed: 2025     Medical Record #: [id number]                                      Page 1 of 1

## (undated) NOTE — ED AVS SNAPSHOT
[facility] AND [facility] in [address] [address] Román Yu    Phone:  [phone]    Fax:  [phone]   MRN: [id number]    Department:  [facility] AND [facility] in [address] [place] [address]   Date of Visit:  1 Discharge Instructions       Take all medications as prescribed and instructed. Return immediately or go to the Emergency Department for fever, shortness of breath, vomiting, worsening symptoms.       Discharge References/Attachments     URI, VIRAL W/ WHEE that Physician.   If you need additional assistance selecting a physician, you may call the [facility] Physician Referral and Class Registration line at [phone] or find a doctor online by visiting [email or web address].    IF THERE IS ANY ANG - If you are a smoker or have smoked in the last 12 months, we encourage you to explore options for quitting.     - If you have concerns related to behavioral health issues or thoughts of harming yourself, contact [facility] a

## (undated) NOTE — LETTER
May 13, 2019     [facility] 5 [address]      Dear Cyn Ibarra:    Below are the results from your recent visit:    Resulted Orders   COMP METABOLIC PANEL (14)   Result Value Ref Range    Glucose 74 70 - 99 mg/dL    Sodium 138 Lymphocyte Absolute 2.13 1.00 - 4.00 x10(3) uL    Monocyte Absolute 0.44 0.10 - 1.00 x10(3) uL    Eosinophil Absolute 0.29 0.00 - 0.70 x10(3) uL    Basophil Absolute 0.03 0.00 - 0.20 x10(3) uL    Immature Granulocyte Absolute 0.01 0.00 - 1.00 x10(3) uL

## (undated) NOTE — LETTER
September 19, 2019     [address] [place] [id number]      Dear Angelo Tucker:    Below are the results from your recent visit:    Resulted Orders   FREE T3 (TRIIODOTHYRONINE)   Result Value Ref Range    T3 Free 2.70 2.40 - 4.20 pg/mL